# Patient Record
Sex: FEMALE | Race: WHITE | NOT HISPANIC OR LATINO
[De-identification: names, ages, dates, MRNs, and addresses within clinical notes are randomized per-mention and may not be internally consistent; named-entity substitution may affect disease eponyms.]

---

## 2017-03-23 ENCOUNTER — APPOINTMENT (OUTPATIENT)
Dept: MAMMOGRAPHY | Facility: HOSPITAL | Age: 62
End: 2017-03-23

## 2017-03-23 ENCOUNTER — OUTPATIENT (OUTPATIENT)
Dept: OUTPATIENT SERVICES | Facility: HOSPITAL | Age: 62
LOS: 1 days | End: 2017-03-23
Payer: COMMERCIAL

## 2017-03-23 PROCEDURE — 77067 SCR MAMMO BI INCL CAD: CPT

## 2017-03-23 PROCEDURE — 77063 BREAST TOMOSYNTHESIS BI: CPT

## 2017-04-13 ENCOUNTER — APPOINTMENT (OUTPATIENT)
Dept: RHEUMATOLOGY | Facility: CLINIC | Age: 62
End: 2017-04-13

## 2017-04-13 VITALS
HEART RATE: 78 BPM | HEIGHT: 63 IN | BODY MASS INDEX: 29.41 KG/M2 | SYSTOLIC BLOOD PRESSURE: 129 MMHG | WEIGHT: 166 LBS | TEMPERATURE: 98.2 F | DIASTOLIC BLOOD PRESSURE: 82 MMHG

## 2017-04-13 DIAGNOSIS — Z82.49 FAMILY HISTORY OF ISCHEMIC HEART DISEASE AND OTHER DISEASES OF THE CIRCULATORY SYSTEM: ICD-10-CM

## 2017-04-13 DIAGNOSIS — I10 ESSENTIAL (PRIMARY) HYPERTENSION: ICD-10-CM

## 2017-04-13 DIAGNOSIS — M19.90 UNSPECIFIED OSTEOARTHRITIS, UNSPECIFIED SITE: ICD-10-CM

## 2017-04-13 DIAGNOSIS — Z82.61 FAMILY HISTORY OF ARTHRITIS: ICD-10-CM

## 2017-04-13 DIAGNOSIS — Z80.6 FAMILY HISTORY OF LEUKEMIA: ICD-10-CM

## 2017-04-13 DIAGNOSIS — Z86.39 PERSONAL HISTORY OF OTHER ENDOCRINE, NUTRITIONAL AND METABOLIC DISEASE: ICD-10-CM

## 2017-04-13 RX ORDER — LABETALOL HCL 200 MG
200 TABLET ORAL
Refills: 0 | Status: ACTIVE | COMMUNITY

## 2017-04-13 RX ORDER — CHOLECALCIFEROL (VITAMIN D3) 25 MCG
TABLET ORAL
Refills: 0 | Status: ACTIVE | COMMUNITY

## 2017-04-14 ENCOUNTER — TRANSCRIPTION ENCOUNTER (OUTPATIENT)
Age: 62
End: 2017-04-14

## 2017-04-14 LAB
25(OH)D3 SERPL-MCNC: 24.5 NG/ML
ALBUMIN SERPL ELPH-MCNC: 4.6 G/DL
ALP BLD-CCNC: 93 U/L
ALT SERPL-CCNC: 30 U/L
ANION GAP SERPL CALC-SCNC: 18 MMOL/L
AST SERPL-CCNC: 24 U/L
BASOPHILS # BLD AUTO: 0.06 K/UL
BASOPHILS NFR BLD AUTO: 1.3 %
BILIRUB SERPL-MCNC: <0.2 MG/DL
BUN SERPL-MCNC: 22 MG/DL
CALCIUM SERPL-MCNC: 9.7 MG/DL
CCP AB SER IA-ACNC: <8 UNITS
CHLORIDE SERPL-SCNC: 101 MMOL/L
CO2 SERPL-SCNC: 22 MMOL/L
CREAT SERPL-MCNC: 1.01 MG/DL
CRP SERPL-MCNC: <0.2 MG/DL
EOSINOPHIL # BLD AUTO: 0.27 K/UL
EOSINOPHIL NFR BLD AUTO: 5.7 %
ERYTHROCYTE [SEDIMENTATION RATE] IN BLOOD BY WESTERGREN METHOD: 13 MM/HR
GLUCOSE SERPL-MCNC: 103 MG/DL
HCT VFR BLD CALC: 36.7 %
HGB BLD-MCNC: 12 G/DL
IMM GRANULOCYTES NFR BLD AUTO: 0 %
LYMPHOCYTES # BLD AUTO: 1.91 K/UL
LYMPHOCYTES NFR BLD AUTO: 40.1 %
MAN DIFF?: NORMAL
MCHC RBC-ENTMCNC: 30.4 PG
MCHC RBC-ENTMCNC: 32.7 GM/DL
MCV RBC AUTO: 92.9 FL
MONOCYTES # BLD AUTO: 0.31 K/UL
MONOCYTES NFR BLD AUTO: 6.5 %
NEUTROPHILS # BLD AUTO: 2.21 K/UL
NEUTROPHILS NFR BLD AUTO: 46.4 %
PLATELET # BLD AUTO: 275 K/UL
POTASSIUM SERPL-SCNC: 4.2 MMOL/L
PROT SERPL-MCNC: 7 G/DL
RBC # BLD: 3.95 M/UL
RBC # FLD: 12.8 %
RF+CCP IGG SER-IMP: NEGATIVE
RHEUMATOID FACT SER QL: <7 IU/ML
SODIUM SERPL-SCNC: 141 MMOL/L
TSH SERPL-ACNC: 3.21 UIU/ML
WBC # FLD AUTO: 4.76 K/UL

## 2017-05-05 ENCOUNTER — APPOINTMENT (OUTPATIENT)
Dept: RHEUMATOLOGY | Facility: CLINIC | Age: 62
End: 2017-05-05

## 2017-05-05 VITALS
TEMPERATURE: 98.2 F | HEART RATE: 83 BPM | DIASTOLIC BLOOD PRESSURE: 70 MMHG | OXYGEN SATURATION: 98 % | HEIGHT: 63 IN | BODY MASS INDEX: 29.59 KG/M2 | WEIGHT: 167 LBS | SYSTOLIC BLOOD PRESSURE: 105 MMHG

## 2017-05-05 DIAGNOSIS — M25.551 PAIN IN RIGHT HIP: ICD-10-CM

## 2017-05-05 DIAGNOSIS — M19.049 PRIMARY OSTEOARTHRITIS, UNSPECIFIED HAND: ICD-10-CM

## 2017-05-05 DIAGNOSIS — M25.552 PAIN IN LEFT HIP: ICD-10-CM

## 2017-05-07 PROBLEM — M19.049 HAND ARTHRITIS: Status: ACTIVE | Noted: 2017-04-13

## 2017-05-08 PROBLEM — M25.552 LEFT HIP PAIN: Status: ACTIVE | Noted: 2017-05-08

## 2017-05-15 ENCOUNTER — TRANSCRIPTION ENCOUNTER (OUTPATIENT)
Age: 62
End: 2017-05-15

## 2017-06-16 ENCOUNTER — APPOINTMENT (OUTPATIENT)
Dept: RHEUMATOLOGY | Facility: CLINIC | Age: 62
End: 2017-06-16

## 2018-03-27 ENCOUNTER — OUTPATIENT (OUTPATIENT)
Dept: OUTPATIENT SERVICES | Facility: HOSPITAL | Age: 63
LOS: 1 days | End: 2018-03-27
Payer: COMMERCIAL

## 2018-03-27 ENCOUNTER — APPOINTMENT (OUTPATIENT)
Dept: MAMMOGRAPHY | Facility: HOSPITAL | Age: 63
End: 2018-03-27
Payer: COMMERCIAL

## 2018-03-27 DIAGNOSIS — Z00.8 ENCOUNTER FOR OTHER GENERAL EXAMINATION: ICD-10-CM

## 2018-03-27 PROCEDURE — 77067 SCR MAMMO BI INCL CAD: CPT

## 2018-03-27 PROCEDURE — 77067 SCR MAMMO BI INCL CAD: CPT | Mod: 26

## 2018-03-27 PROCEDURE — 77063 BREAST TOMOSYNTHESIS BI: CPT

## 2018-03-27 PROCEDURE — 77063 BREAST TOMOSYNTHESIS BI: CPT | Mod: 26

## 2019-04-02 ENCOUNTER — APPOINTMENT (OUTPATIENT)
Dept: MAMMOGRAPHY | Facility: HOSPITAL | Age: 64
End: 2019-04-02
Payer: COMMERCIAL

## 2019-04-02 ENCOUNTER — OUTPATIENT (OUTPATIENT)
Dept: OUTPATIENT SERVICES | Facility: HOSPITAL | Age: 64
LOS: 1 days | End: 2019-04-02
Payer: COMMERCIAL

## 2019-04-02 DIAGNOSIS — Z00.8 ENCOUNTER FOR OTHER GENERAL EXAMINATION: ICD-10-CM

## 2019-04-02 PROCEDURE — 77067 SCR MAMMO BI INCL CAD: CPT | Mod: 26

## 2019-04-02 PROCEDURE — 77067 SCR MAMMO BI INCL CAD: CPT

## 2019-04-02 PROCEDURE — 77063 BREAST TOMOSYNTHESIS BI: CPT | Mod: 26

## 2019-04-02 PROCEDURE — 77063 BREAST TOMOSYNTHESIS BI: CPT

## 2020-04-29 ENCOUNTER — TRANSCRIPTION ENCOUNTER (OUTPATIENT)
Age: 65
End: 2020-04-29

## 2020-12-23 ENCOUNTER — TRANSCRIPTION ENCOUNTER (OUTPATIENT)
Age: 65
End: 2020-12-23

## 2021-01-01 ENCOUNTER — TRANSCRIPTION ENCOUNTER (OUTPATIENT)
Age: 66
End: 2021-01-01

## 2021-01-28 ENCOUNTER — TRANSCRIPTION ENCOUNTER (OUTPATIENT)
Age: 66
End: 2021-01-28

## 2021-03-11 ENCOUNTER — OUTPATIENT (OUTPATIENT)
Dept: OUTPATIENT SERVICES | Facility: HOSPITAL | Age: 66
LOS: 1 days | End: 2021-03-11
Payer: COMMERCIAL

## 2021-03-11 ENCOUNTER — APPOINTMENT (OUTPATIENT)
Dept: MAMMOGRAPHY | Facility: HOSPITAL | Age: 66
End: 2021-03-11
Payer: COMMERCIAL

## 2021-03-11 DIAGNOSIS — Z00.8 ENCOUNTER FOR OTHER GENERAL EXAMINATION: ICD-10-CM

## 2021-03-11 PROCEDURE — 77067 SCR MAMMO BI INCL CAD: CPT | Mod: 26

## 2021-03-11 PROCEDURE — 77067 SCR MAMMO BI INCL CAD: CPT

## 2021-03-11 PROCEDURE — 77063 BREAST TOMOSYNTHESIS BI: CPT | Mod: 26

## 2021-03-11 PROCEDURE — 77063 BREAST TOMOSYNTHESIS BI: CPT

## 2021-05-04 ENCOUNTER — OUTPATIENT (OUTPATIENT)
Dept: OUTPATIENT SERVICES | Facility: HOSPITAL | Age: 66
LOS: 1 days | End: 2021-05-04
Payer: COMMERCIAL

## 2021-05-04 ENCOUNTER — APPOINTMENT (OUTPATIENT)
Dept: CT IMAGING | Facility: HOSPITAL | Age: 66
End: 2021-05-04
Payer: COMMERCIAL

## 2021-05-04 ENCOUNTER — APPOINTMENT (OUTPATIENT)
Dept: ULTRASOUND IMAGING | Facility: HOSPITAL | Age: 66
End: 2021-05-04
Payer: COMMERCIAL

## 2021-05-04 DIAGNOSIS — Z00.8 ENCOUNTER FOR OTHER GENERAL EXAMINATION: ICD-10-CM

## 2021-05-04 PROCEDURE — 76641 ULTRASOUND BREAST COMPLETE: CPT | Mod: 26,50

## 2021-05-04 PROCEDURE — 76641 ULTRASOUND BREAST COMPLETE: CPT

## 2021-05-04 PROCEDURE — 71250 CT THORAX DX C-: CPT

## 2021-05-04 PROCEDURE — 71250 CT THORAX DX C-: CPT | Mod: 26

## 2021-07-30 ENCOUNTER — TRANSCRIPTION ENCOUNTER (OUTPATIENT)
Age: 66
End: 2021-07-30

## 2021-10-01 ENCOUNTER — TRANSCRIPTION ENCOUNTER (OUTPATIENT)
Age: 66
End: 2021-10-01

## 2022-01-04 ENCOUNTER — EMERGENCY (EMERGENCY)
Facility: HOSPITAL | Age: 67
LOS: 1 days | Discharge: HOME | End: 2022-01-04
Attending: INTERNAL MEDICINE | Admitting: INTERNAL MEDICINE
Payer: COMMERCIAL

## 2022-01-04 VITALS
SYSTOLIC BLOOD PRESSURE: 148 MMHG | HEIGHT: 65 IN | RESPIRATION RATE: 16 BRPM | DIASTOLIC BLOOD PRESSURE: 92 MMHG | TEMPERATURE: 97 F | OXYGEN SATURATION: 96 % | HEART RATE: 91 BPM | WEIGHT: 160.06 LBS

## 2022-01-04 VITALS
HEART RATE: 86 BPM | RESPIRATION RATE: 16 BRPM | SYSTOLIC BLOOD PRESSURE: 136 MMHG | DIASTOLIC BLOOD PRESSURE: 86 MMHG | OXYGEN SATURATION: 97 %

## 2022-01-04 PROCEDURE — 93010 ELECTROCARDIOGRAM REPORT: CPT

## 2022-01-04 PROCEDURE — 71045 X-RAY EXAM CHEST 1 VIEW: CPT | Mod: 26

## 2022-01-04 PROCEDURE — 99285 EMERGENCY DEPT VISIT HI MDM: CPT

## 2022-01-04 PROCEDURE — 99283 EMERGENCY DEPT VISIT LOW MDM: CPT | Mod: 25

## 2022-01-04 PROCEDURE — 71045 X-RAY EXAM CHEST 1 VIEW: CPT

## 2022-01-04 PROCEDURE — 93005 ELECTROCARDIOGRAM TRACING: CPT

## 2022-01-04 RX ORDER — DIAZEPAM 5 MG
1 TABLET ORAL
Qty: 7 | Refills: 0
Start: 2022-01-04 | End: 2022-01-10

## 2022-01-04 RX ORDER — DIAZEPAM 5 MG
5 TABLET ORAL ONCE
Refills: 0 | Status: DISCONTINUED | OUTPATIENT
Start: 2022-01-04 | End: 2022-01-04

## 2022-01-04 RX ORDER — LIDOCAINE 4 G/100G
1 CREAM TOPICAL ONCE
Refills: 0 | Status: COMPLETED | OUTPATIENT
Start: 2022-01-04 | End: 2022-01-04

## 2022-01-04 RX ORDER — LIDOCAINE 4 G/100G
1 CREAM TOPICAL
Qty: 20 | Refills: 0
Start: 2022-01-04 | End: 2022-01-13

## 2022-01-04 RX ORDER — IBUPROFEN 200 MG
600 TABLET ORAL ONCE
Refills: 0 | Status: COMPLETED | OUTPATIENT
Start: 2022-01-04 | End: 2022-01-04

## 2022-01-04 RX ORDER — IBUPROFEN 200 MG
1 TABLET ORAL
Qty: 40 | Refills: 0
Start: 2022-01-04 | End: 2022-01-13

## 2022-01-04 RX ADMIN — LIDOCAINE 1 PATCH: 4 CREAM TOPICAL at 11:01

## 2022-01-04 RX ADMIN — Medication 600 MILLIGRAM(S): at 11:00

## 2022-01-04 RX ADMIN — Medication 5 MILLIGRAM(S): at 11:00

## 2022-01-04 NOTE — ED PROVIDER NOTE - CLINICAL SUMMARY MEDICAL DECISION MAKING FREE TEXT BOX
r upper back pain few days seen by pmd rx prednisone, flexeril with no relief  onset gradual   locations R upper back   duration few days   characteristics upper back pain   context seen by pmd dc with flexeril and prednisone with no relief  aggravating factors worse om movement ,   relieving factors none   timming constant   severity moderate  cxr  marcela , ekg nsr   rx with valium lidocaine patch and nsaids with relief

## 2022-01-04 NOTE — ED ADULT NURSE NOTE - NSIMPLEMENTINTERV_GEN_ALL_ED
Implemented All Universal Safety Interventions:  [place] to call system. Call bell, personal items and telephone within reach. Instruct patient to call for assistance. Room bathroom lighting operational. Non-slip footwear when patient is off stretcher. Physically safe environment: no spills, clutter or unnecessary equipment. Stretcher in lowest position, wheels locked, appropriate side rails in place.

## 2022-01-04 NOTE — ED PROVIDER NOTE - CARE PROVIDER_API CALL
Jose Dumont)  Orthopaedic Sports Medicine; Orthopaedic Surgery  [address] [address]  Phone: [phone]  Fax: [phone]  Follow Up Time:

## 2022-01-04 NOTE — ED PROVIDER NOTE - PHYSICAL EXAMINATION
General:     NAD, well-nourished, well-appearing  Head:     NC/AT, EOMI, oral mucosa moist  Neck:     trachea midline  Lungs:     CTA b/l, no w/r/r  R interscapular tenderness  CVS:     S1S2, RRR, no m/g/r  Abd:     +BS, s/nt/nd, no organomegaly  Ext:    2+ radial and pedal pulses, no c/c/e  Neuro: AAOx3, no sensory/motor deficits

## 2022-01-04 NOTE — ED PROVIDER NOTE - OBJECTIVE STATEMENT
r upper back pain few days seen by pmd rx prednisone, flexeril with no relief r upper back pain few days seen by pmd rx prednisone, flexeril with no relief  onset gradual   locations R upper back   duration few days   characteristics upper back pain   context seen by pmd dc with flexeril and prednisone with no relief  aggravating factors worse om movement ,   relieving factors none   timming constant   severity moderate

## 2022-01-04 NOTE — ED ADULT NURSE NOTE - OBJECTIVE STATEMENT
patient presents to ED complaining of R. shoulder/back pain, given prednisone pack and flexiril by PMD without relief. patient AOx4, breathing symmetrical and unlabored, in no acute distress at this time, will continue to monitor.

## 2022-01-04 NOTE — ED PROVIDER NOTE - NSCAREINITIATED _GEN_ER
Biliblanket d/c'd per order. MOB educated that rebound bili will be drawn in the morning. Stef Brock(Attending)

## 2022-01-04 NOTE — ED PROVIDER NOTE - PATIENT PORTAL LINK FT
You can access the FollowMyHealth Patient Portal offered by [facility] by registering at the following website: [email or web address]. By joining [employer]’s FollowMyHealth portal, you will also be able to view your health information using other applications (apps) compatible with our system.

## 2022-01-05 ENCOUNTER — NON-APPOINTMENT (OUTPATIENT)
Age: 67
End: 2022-01-05

## 2022-01-06 ENCOUNTER — NON-APPOINTMENT (OUTPATIENT)
Age: 67
End: 2022-01-06

## 2022-01-06 ENCOUNTER — APPOINTMENT (OUTPATIENT)
Dept: ORTHOPEDIC SURGERY | Facility: CLINIC | Age: 67
End: 2022-01-06
Payer: COMMERCIAL

## 2022-01-06 VITALS
DIASTOLIC BLOOD PRESSURE: 87 MMHG | BODY MASS INDEX: 26.66 KG/M2 | WEIGHT: 160 LBS | SYSTOLIC BLOOD PRESSURE: 135 MMHG | HEIGHT: 65 IN | HEART RATE: 105 BPM

## 2022-01-06 DIAGNOSIS — M47.22 OTHER SPONDYLOSIS WITH RADICULOPATHY, CERVICAL REGION: ICD-10-CM

## 2022-01-06 DIAGNOSIS — M54.12 RADICULOPATHY, CERVICAL REGION: ICD-10-CM

## 2022-01-06 DIAGNOSIS — M75.41 IMPINGEMENT SYNDROME OF RIGHT SHOULDER: ICD-10-CM

## 2022-01-06 PROCEDURE — 73030 X-RAY EXAM OF SHOULDER: CPT | Mod: RT

## 2022-01-06 PROCEDURE — 72050 X-RAY EXAM NECK SPINE 4/5VWS: CPT

## 2022-01-06 PROCEDURE — 99203 OFFICE O/P NEW LOW 30 MIN: CPT

## 2022-01-06 NOTE — CONSULT LETTER
[Dear  ___] : Dear  [unfilled], [Consult Letter:] : I had the pleasure of evaluating your patient, [unfilled]. [Please see my note below.] : Please see my note below. [Consult Closing:] : Thank you very much for allowing me to participate in the care of this patient.  If you have any questions, please do not hesitate to contact me. [Sincerely,] : Sincerely, [FreeTextEntry3] : Dr. Jose Dumont \par \par

## 2022-01-06 NOTE — HISTORY OF PRESENT ILLNESS
[de-identified] : VIKTOR CHUN is a 66 year old RHD female presenting to the office complaining of right shoulder pain. Patient reports pain since December 2021. Patient denies injury or trauma to the area. She notes she walks her husky puppy daily but does not recall any injury from this. The patient describes the pain as a dull aching, and occasionally sharp pain localized to the anterior posterior of her right shoulder that is intermittent in nature. Her  symptoms are exacerbated with any movement of the shoulder. Patient reports the pain is waking her up at night.  Patient reports associated weakness. Reports numbness and tingling in the right upper extremity into the hand.  She saw her PCP on 12/28/2021 who prescribed Flexeril and a Medrol dose efraín noting no relief in symptoms. She went to the ED on 1/4/2022 due to severe pain where she was prescribed lidocaine patches, naproxen and Valium noting no relief in symptoms.

## 2022-01-06 NOTE — PHYSICAL EXAM
[de-identified] : Cervical Spine/Neck\par Inspection/Palpation :\par ¦ Inspection : alignment midline, normal degree of lordosis present\par ¦ Skin : normal appearance, no masses, no tenderness, trachea midline\par ¦ Palpation : marked right paraspinal, trapezius periscapular musculature is tender to palpation\par ¦ Tests and Signs : Spurling’s (-), Lhermitte’s (-) Zeina’s Reflex (-) \par ¦ Range of Motion : arc of motion full in all planes, no crepitus or pain with ROM\par ¦ Stability : no subluxations or other evidence of instability demonstrated during range of motion testing\par o Muscle Strength : paraspinal muscle strength within normal limits\par o Muscle Tone : paraspinal muscle tone within normal limits\par o Muscle Bulk : normal, no atrophy\par o Cervical Lymph Nodes : no lymphadenopathy present\par ¦ Upper Extremity Strength : elbow flexion 5/5, elbow extension 5/5, wrist extension 5/5, wrist flexion 5/5, ulnar deviation  5/5,  radial deviation 5/5, all intrinsic and extrinsic hand muscles 5/5,  strength 5/5\par o Special Tests: Froment (- bilateral) \par \par  Right Upper Extremity\par o Shoulder :\par ¦ Inspection/Palpation :  no tenderness over the greater tuberosity, no acromioclavicular joint tenderness, no tenderness anterior and posterior glenohumeral joint,no swelling, no deformities\par ¦ Range of Motion : ACTIVE FORWARD ELEVATION: Measured at 120 degrees, ACTIVE EXTERNAL ROTATION: Measured at 75 degrees, ACTIVE INTERNAL ROTATION: Measured at GT\par ¦ Strength : external rotation 5/5, internal rotation 5/5, supraspinatus 5/5\par ¦ Stability : no joint instability on provocative testing\par ¦ Tests/Signs : Neer (+), Garcia (+)\par o Upper Arm : no tenderness, no swelling, no deformities\par o Muscle Bulk : no atrophy\par o Sensation : sensation intact to light touch\par o Skin : no skin rash or discoloration\par o Vascular Exam : no edema, no cyanosis, radial and ulnar pulses normal \par \par Left Upper Extremity\par o Shoulder :\par ¦ Inspection/Palpation :  no tenderness over the greater tuberosity, no acromioclavicular joint tenderness, no tenderness anterior and posterior glenohumeral joint,no swelling, no deformities\par ¦ Range of Motion : ACTIVE FORWARD ELEVATION: Measured at 130 degrees, ACTIVE EXTERNAL ROTATION: Measured at 75 degrees, ACTIVE INTERNAL ROTATION: Measured at T12\par ¦ Strength : external rotation 5/5, internal rotation 5/5, supraspinatus 5/5\par ¦ Stability : no joint instability on provocative testing\par ¦ Tests/Signs : Neer (-), Garcia (-)\par o Upper Arm : no tenderness, no swelling, no deformities\par o Muscle Bulk : no atrophy\par o Sensation : sensation intact to light touch\par o Skin : no skin rash or discoloration\par o Vascular Exam : no edema, no cyanosis, radial and ulnar pulses normal  [de-identified] : o Cervical Spine : AP, lateral, and oblique views were obtained, there are no soft tissue abnormalities, no fractures, straightening of the normal cervical lordosis,  normal bone density, no bony lesions, marked diffuse degenerative changes, advanced C5/C6, C6/C7, C7/T1, C4/C5, C5/C6 foraminal narrowing spondylolisthesis C4/C5. \par \par \par o RIGHT Shoulder : Grashey, Axillary and Outlet views were obtained, there are no soft tissue abnormalities, no fractures, alignment is normal, normal appearing joint spaces, normal bone density, no bony lesions.\par \par

## 2022-01-06 NOTE — DISCUSSION/SUMMARY
[de-identified] : The underlying pathophysiology was reviewed in great detail with the patient as well as the various treatment options, including ice, analgesics, NSAIDs, Physical therapy, steroid injections, referral to spinal specialist. \par \par A prescription was provided for a MRI of the cervical spine to rule out HNP\par \par Activity modifications and restrictions were discussed. I advised avoiding overhead lifting. I advised the patient to work on good posture. \par \par A home exercise sheet was given and discussed with the patient to follow. \par \par FU 6 weeks\par \par All questions were answered, all alternatives discussed and the patient is in complete agreement with that plan. Follow-up appointment as instructed. Any issues and the patient will call or come in sooner.

## 2022-01-10 ENCOUNTER — APPOINTMENT (OUTPATIENT)
Dept: MRI IMAGING | Facility: HOSPITAL | Age: 67
End: 2022-01-10
Payer: COMMERCIAL

## 2022-01-10 ENCOUNTER — OUTPATIENT (OUTPATIENT)
Dept: OUTPATIENT SERVICES | Facility: HOSPITAL | Age: 67
LOS: 1 days | End: 2022-01-10
Payer: COMMERCIAL

## 2022-01-10 DIAGNOSIS — M47.22 OTHER SPONDYLOSIS WITH RADICULOPATHY, CERVICAL REGION: ICD-10-CM

## 2022-01-10 PROCEDURE — 72141 MRI NECK SPINE W/O DYE: CPT

## 2022-01-10 PROCEDURE — 72141 MRI NECK SPINE W/O DYE: CPT | Mod: 26

## 2022-01-13 ENCOUNTER — NON-APPOINTMENT (OUTPATIENT)
Age: 67
End: 2022-01-13

## 2022-04-07 ENCOUNTER — APPOINTMENT (OUTPATIENT)
Dept: ORTHOPEDIC SURGERY | Facility: CLINIC | Age: 67
End: 2022-04-07
Payer: COMMERCIAL

## 2022-04-07 VITALS — HEIGHT: 65 IN | BODY MASS INDEX: 26.66 KG/M2 | WEIGHT: 160 LBS

## 2022-04-07 PROCEDURE — 72100 X-RAY EXAM L-S SPINE 2/3 VWS: CPT

## 2022-04-07 PROCEDURE — 99214 OFFICE O/P EST MOD 30 MIN: CPT

## 2022-04-07 NOTE — DISCUSSION/SUMMARY
[de-identified] : The underlying pathophysiology was reviewed in great detail with the patient as well as the various treatment options, including ice, analgesics, NSAIDS, Physical therapy, steroid injections.\par \par A prescription was provided for physical therapy\par \par We discussed f/u with a spine specialist.

## 2022-04-07 NOTE — HISTORY OF PRESENT ILLNESS
[Pain Location] : pain [] : right knee [Lumbar] : lumbar region [Worsening] : worsening [Walking] : walking [de-identified] : Established patient coming in with a new complaint of low back pain radiating down into the right leg. \par Patient continues to do physical therapy of the cervical spine and progressing well

## 2022-04-07 NOTE — PHYSICAL EXAM
[Normal] : no peripheral adenopathy appreciated [de-identified] : No palpable tenderness of the lumbar spine, right sided thoracolumbar  prominence on forward bend\par Patient is unable to stretch the hamstrings\par Negative tension sign\par 5/5 strength with flexion of the hips [de-identified] : \par o Lumbosacral Spine : AP and lateral views were obtained, there are no soft tissue abnormalities, 37 degree degenerative scoliosis from L1-L5, no fractures, marked diffuse degenerative disc disease, normal bone density, no bony lesions\par \par

## 2022-05-05 ENCOUNTER — APPOINTMENT (OUTPATIENT)
Dept: ORTHOPEDIC SURGERY | Facility: CLINIC | Age: 67
End: 2022-05-05
Payer: COMMERCIAL

## 2022-05-05 VITALS — BODY MASS INDEX: 26.66 KG/M2 | WEIGHT: 160 LBS | HEIGHT: 65 IN

## 2022-05-05 DIAGNOSIS — M41.26 OTHER IDIOPATHIC SCOLIOSIS, LUMBAR REGION: ICD-10-CM

## 2022-05-05 PROCEDURE — 99214 OFFICE O/P EST MOD 30 MIN: CPT

## 2022-05-07 ENCOUNTER — OUTPATIENT (OUTPATIENT)
Dept: OUTPATIENT SERVICES | Facility: HOSPITAL | Age: 67
LOS: 1 days | End: 2022-05-07
Payer: COMMERCIAL

## 2022-05-07 ENCOUNTER — APPOINTMENT (OUTPATIENT)
Dept: MRI IMAGING | Facility: HOSPITAL | Age: 67
End: 2022-05-07
Payer: COMMERCIAL

## 2022-05-07 DIAGNOSIS — M54.16 RADICULOPATHY, LUMBAR REGION: ICD-10-CM

## 2022-05-07 PROBLEM — M41.26 OTHER IDIOPATHIC SCOLIOSIS, LUMBAR REGION: Status: ACTIVE | Noted: 2022-04-07

## 2022-05-07 PROCEDURE — 72148 MRI LUMBAR SPINE W/O DYE: CPT

## 2022-05-07 PROCEDURE — 72148 MRI LUMBAR SPINE W/O DYE: CPT | Mod: 26

## 2022-05-07 NOTE — DISCUSSION/SUMMARY
[de-identified] : The underlying pathophysiology was reviewed in great detail with the patient as well as the various treatment options, including ice, analgesics, NSAIDS, Physical therapy, steroid injections.\par \par A prescription was provided for MRI to r/o HNP\par \par A prescription was given for a medrol dose pack\par \par We discussed f/u with a spine specialist.\par \par F/U after MRI

## 2022-05-07 NOTE — PHYSICAL EXAM
[Normal] : No costovertebral angle tenderness and no spinal tenderness [de-identified] : Back: No costovertebral angle tenderness and no spinal tenderness, right sided thoracolumbar prominence on forward bend with tenderness in the right paraspinal musculature\par Patient is unable to stretch the hamstrings\par + straight leg raise sign on the right\par 5/5 strength with flexion of the hips. \par Reflexes intact\par Sensation intact [de-identified] : No palpable tenderness of the lumbar spine, right sided thoracolumbar  prominence on forward bend\par Patient is unable to stretch the hamstrings\par Negative tension sign\par 5/5 strength with flexion of the hips [de-identified] : x-rays reviewed from prior visit:\par o Lumbosacral Spine : AP and lateral views were obtained, there are no soft tissue abnormalities, 37 degree degenerative scoliosis from L1-L5, no fractures, marked diffuse degenerative disc disease, normal bone density, no bony lesions\par \par

## 2022-05-07 NOTE — HISTORY OF PRESENT ILLNESS
[Pain Location] : pain [] : right knee [Lumbar] : lumbar region [Worsening] : worsening [Walking] : walking [de-identified] : Established patient coming in with a new complaint of low back pain radiating down into the right leg. \par Patient continues to do physical therapy from 4/7/22.

## 2022-05-13 ENCOUNTER — NON-APPOINTMENT (OUTPATIENT)
Age: 67
End: 2022-05-13

## 2022-05-16 ENCOUNTER — NON-APPOINTMENT (OUTPATIENT)
Age: 67
End: 2022-05-16

## 2022-05-18 ENCOUNTER — APPOINTMENT (OUTPATIENT)
Dept: ORTHOPEDIC SURGERY | Facility: CLINIC | Age: 67
End: 2022-05-18
Payer: COMMERCIAL

## 2022-05-18 VITALS — WEIGHT: 160 LBS | BODY MASS INDEX: 26.66 KG/M2 | HEIGHT: 65 IN

## 2022-05-18 DIAGNOSIS — S93.602A UNSPECIFIED SPRAIN OF LEFT FOOT, INITIAL ENCOUNTER: ICD-10-CM

## 2022-05-18 DIAGNOSIS — S00.93XA CONTUSION OF UNSPECIFIED PART OF HEAD, INITIAL ENCOUNTER: ICD-10-CM

## 2022-05-18 PROCEDURE — 73630 X-RAY EXAM OF FOOT: CPT | Mod: LT

## 2022-05-18 PROCEDURE — 99203 OFFICE O/P NEW LOW 30 MIN: CPT

## 2022-05-18 RX ORDER — ATORVASTATIN CALCIUM 10 MG/1
10 TABLET, FILM COATED ORAL
Refills: 0 | Status: COMPLETED | COMMUNITY
End: 2022-05-18

## 2022-05-18 RX ORDER — DICLOFENAC SODIUM 50 MG/1
50 TABLET, DELAYED RELEASE ORAL
Refills: 0 | Status: COMPLETED | COMMUNITY
End: 2022-05-18

## 2022-05-18 RX ORDER — METHYLPREDNISOLONE 4 MG/1
4 TABLET ORAL
Qty: 1 | Refills: 0 | Status: COMPLETED | COMMUNITY
Start: 2022-05-05 | End: 2022-05-18

## 2022-05-18 RX ORDER — PREDNISONE 2.5 MG/1
2.5 TABLET ORAL
Qty: 18 | Refills: 0 | Status: DISCONTINUED | COMMUNITY
Start: 2017-04-13 | End: 2022-05-18

## 2022-05-18 NOTE — DISCUSSION/SUMMARY
[de-identified] : The patient was placed in a short leg Cam Walker\par She will be weightbearing as tolerated with cane or crutches\par She may remove Cam Walker for bathing and sleeping if comfortable\par She will have CT scan of her head to rule out occult injury\par She will have MRI left foot\par Ice and Tylenol p.r.n.\par \par Impression:\par Status post fall 5/17/22\par Contusion head\par Sprain left foot

## 2022-05-18 NOTE — PHYSICAL EXAM
[Normal Mood and Affect] : normal mood and affect [Able to Communicate] : able to communicate [Well Developed] : well developed [Well Nourished] : well nourished [2+] : dorsalis pedis pulse: 2+ [] : ambulation with crutches [Left] : left foot [FreeTextEntry3] : Mild swelling dorsal aspect of the midfoot [FreeTextEntry8] : Mild to moderate tenderness over the first and second tarsometatarsal joints.  No tenderness over her toes [de-identified] : Reviewed and interpreted.  Left foot AP, lateral oblique-negative

## 2022-05-18 NOTE — HISTORY OF PRESENT ILLNESS
[Sudden] : sudden [8] : 8 [Dull/Aching] : dull/aching [Sharp] : sharp [Household chores] : household chores [Rest] : rest [Walking] : walking [Full time] : Work status: full time [de-identified] : Urgent visit.  Date of injury 5/17/22.  The patient was pulled by her dog and fell injuring her left foot.  Seen at [facility] urgent care and had x-rays.  Told of possible fracture left foot.  She was placed in a short leg splint.  Given crutches.  She says she hit her head against the wall when she fell.  She denies any headaches or swelling in her head.  Her neck is fine.\par She has mild to moderate pain left midfoot.  Mild swelling.  Seen today with her , Gary [] : Post Surgical Visit: no [FreeTextEntry1] : L Foot  [de-identified] : [employer]

## 2022-05-19 ENCOUNTER — APPOINTMENT (OUTPATIENT)
Dept: CT IMAGING | Facility: HOSPITAL | Age: 67
End: 2022-05-19
Payer: COMMERCIAL

## 2022-05-19 ENCOUNTER — RESULT REVIEW (OUTPATIENT)
Age: 67
End: 2022-05-19

## 2022-05-19 ENCOUNTER — OUTPATIENT (OUTPATIENT)
Dept: OUTPATIENT SERVICES | Facility: HOSPITAL | Age: 67
LOS: 1 days | End: 2022-05-19
Payer: COMMERCIAL

## 2022-05-19 DIAGNOSIS — S00.93XA CONTUSION OF UNSPECIFIED PART OF HEAD, INITIAL ENCOUNTER: ICD-10-CM

## 2022-05-19 PROCEDURE — 70450 CT HEAD/BRAIN W/O DYE: CPT

## 2022-05-19 PROCEDURE — 70450 CT HEAD/BRAIN W/O DYE: CPT | Mod: 26

## 2022-05-20 ENCOUNTER — NON-APPOINTMENT (OUTPATIENT)
Age: 67
End: 2022-05-20

## 2022-05-20 ENCOUNTER — RESULT REVIEW (OUTPATIENT)
Age: 67
End: 2022-05-20

## 2022-05-20 ENCOUNTER — APPOINTMENT (OUTPATIENT)
Dept: MRI IMAGING | Facility: HOSPITAL | Age: 67
End: 2022-05-20
Payer: COMMERCIAL

## 2022-05-20 ENCOUNTER — OUTPATIENT (OUTPATIENT)
Dept: OUTPATIENT SERVICES | Facility: HOSPITAL | Age: 67
LOS: 1 days | End: 2022-05-20
Payer: COMMERCIAL
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
## 2022-05-20 DIAGNOSIS — M67.472 GANGLION, LEFT ANKLE AND FOOT: ICD-10-CM

## 2022-05-20 DIAGNOSIS — M79.672 PAIN IN LEFT FOOT: ICD-10-CM

## 2022-05-20 DIAGNOSIS — M79.89 OTHER SPECIFIED SOFT TISSUE DISORDERS: ICD-10-CM

## 2022-05-20 DIAGNOSIS — S93.602A UNSPECIFIED SPRAIN OF LEFT FOOT, INITIAL ENCOUNTER: ICD-10-CM
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
## 2022-05-20 PROCEDURE — 73718 MRI LOWER EXTREMITY W/O DYE: CPT | Mod: 26,LT

## 2022-05-20 PROCEDURE — 73718 MRI LOWER EXTREMITY W/O DYE: CPT

## 2022-05-23 ENCOUNTER — NON-APPOINTMENT (OUTPATIENT)
Age: 67
End: 2022-05-23

## 2022-05-24 ENCOUNTER — NON-APPOINTMENT (OUTPATIENT)
Age: 67
End: 2022-05-24

## 2022-05-26 ENCOUNTER — APPOINTMENT (OUTPATIENT)
Dept: NEUROLOGY | Facility: CLINIC | Age: 67
End: 2022-05-26

## 2022-05-27 ENCOUNTER — NON-APPOINTMENT (OUTPATIENT)
Age: 67
End: 2022-05-27

## 2022-06-02 ENCOUNTER — APPOINTMENT (OUTPATIENT)
Dept: ORTHOPEDIC SURGERY | Facility: CLINIC | Age: 67
End: 2022-06-02
Payer: COMMERCIAL

## 2022-06-02 ENCOUNTER — APPOINTMENT (OUTPATIENT)
Dept: ORTHOPEDIC SURGERY | Facility: CLINIC | Age: 67
End: 2022-06-02

## 2022-06-02 ENCOUNTER — NON-APPOINTMENT (OUTPATIENT)
Age: 67
End: 2022-06-02

## 2022-06-02 VITALS — WEIGHT: 160 LBS | BODY MASS INDEX: 26.66 KG/M2 | HEIGHT: 65 IN

## 2022-06-02 DIAGNOSIS — S92.322A DISPLACED FRACTURE OF SECOND METATARSAL BONE, LEFT FOOT, INITIAL ENCOUNTER FOR CLOSED FRACTURE: ICD-10-CM

## 2022-06-02 PROCEDURE — 73630 X-RAY EXAM OF FOOT: CPT | Mod: LT

## 2022-06-02 PROCEDURE — 28470 CLTX METATARSAL FX WO MNP EA: CPT

## 2022-06-02 PROCEDURE — 99214 OFFICE O/P EST MOD 30 MIN: CPT | Mod: 25

## 2022-06-02 NOTE — ASSESSMENT
[FreeTextEntry1] : protected wb in cam boot\par ice/elevate\par nsaids prn\par f/up 2 wks w/ foot xray

## 2022-06-02 NOTE — PHYSICAL EXAM
[Left] : left foot and ankle [NL (20)] : dorsiflexion 20 degrees [NL (40)] : plantar flexion 40 degrees [5___] : eversion 5[unfilled]/5 [2+] : dorsalis pedis pulse: 2+ [] : negative anterior drawer at ankle

## 2022-06-02 NOTE — HISTORY OF PRESENT ILLNESS
[Sudden] : sudden [8] : 8 [0] : 0 [Sharp] : sharp [Constant] : constant [Household chores] : household chores [Leisure] : leisure [Work] : work [Social interactions] : social interactions [Rest] : rest [Standing] : standing [Walking] : walking [Stairs] : stairs [Full time] : Work status: full time [de-identified] : 06/02/2022: pt states she was walking her dog and her dog started running and she fell and twisted her foot on 5/17/22. went to Urgent care and saw dr asher and was put in a boot and sent for MRI. has been wbat in boot. no prior foot probs. no dm/tob.  [] : no [FreeTextEntry1] : LT foot [FreeTextEntry3] : 5/17/22 [de-identified] : boot [de-identified] : Ernesto  [de-identified] : MRI/X-ray

## 2022-06-02 NOTE — DATA REVIEWED
[MRI] : MRI [Foot] : foot [I reviewed the films/CD and additionally noted] : I reviewed the films/CD and additionally noted [FreeTextEntry1] : plantar base 2nd mt [facility]

## 2022-06-08 ENCOUNTER — APPOINTMENT (OUTPATIENT)
Dept: PAIN MANAGEMENT | Facility: CLINIC | Age: 67
End: 2022-06-08
Payer: COMMERCIAL

## 2022-06-08 VITALS — BODY MASS INDEX: 26.66 KG/M2 | WEIGHT: 160 LBS | HEIGHT: 65 IN

## 2022-06-08 PROCEDURE — 99204 OFFICE O/P NEW MOD 45 MIN: CPT

## 2022-06-08 NOTE — HISTORY OF PRESENT ILLNESS
[Lower back] : lower back [10] : 10 [Sharp] : sharp [Shooting] : shooting [Stabbing] : stabbing [Throbbing] : throbbing [Constant] : constant [Household chores] : household chores [Leisure] : leisure [Nothing helps with pain getting better] : Nothing helps with pain getting better [Standing] : standing [FreeTextEntry1] : 06/08/2022 : Patient presents for initial evaluation. She complains of pain in the lower back with radiation down the right lateral leg to the foot. Pain got worse over the last 3 months. PT was not helping. +n/t , No weakness. She takes Advil PRN with limited relief. She saw Dr. Ojeda. she was diagnosed with a foot fracture and currently in a walking boot. \par \par Subjective Weakness:No\par Numbness/Tingling: Yes\par Bladder/Bowel dysfunction: Yes/No\par Physical Therapy: Yes\par \par \par Attempted modalities for current pain complaint:\par See above:\par Medications:No\par \par Injections:No \par \par Previous Spine Surgery: N/A\par \par Imaging:\par MRI Lumbar Spine (5/7/22): \par IMPRESSION:\par Multilevel lumbar spondylosis with dextroscoliosis, exaggerated lumbar lordosis, right lateral translation at L1-2 and left lateral translation at L4-5, and grade 1 anterolisthesis at L5-S1 with bilateral spondylolysis.\par \par Multilevel moderate to severe narrowing of the lateral recesses and neural foramina. Severe central stenosis at L5-S1, moderate central stenosis at L3-4 and L4-5, and milder canal narrowing in the upper lumbar spine.\par \par  [] : no [FreeTextEntry7] : Right leg to the foot  [de-identified] : Getting up from a sitting position [de-identified] : 05/07/2022 Amilcar granados [place]

## 2022-06-08 NOTE — ASSESSMENT
[FreeTextEntry1] : After discussing various treatment options with the patient including but not limited to oral medications, physical therapy, exercise, modalities as well as interventional spinal injections, we have decided with the following plan:\par \par I personally reviewed the MRI/CT scan images and agree with the radiologist's report. The radiological findings were discussed with the patient.\par \par The risks, benefits, contents and alternatives to injection were explained in full to the patient. Risks outlined include but are not limited to infection,sepsis, bleeding, post-dural puncture headache, nerve damage, temporary increase in pain, syncopal episode, failure to resolve symptoms, allergic reaction, symptom recurrence, and elevation of blood sugar in diabetics. Cortisone may cause immunosuppression. Patient understands the risks. All questions were answered. After discussion of options, patient requested an injection. Information regarding the injection was given to the patient. Which medications to stop prior to the injection was explained to the patient as well.\par \par Follow up in 1-2 weeks post injection for re-evaluation. \par \par Continue Home exercises, stretching, activity modification, physical therapy, and conservative care.\par \par \par right L4/5 L5/S1 TFESI

## 2022-06-08 NOTE — PHYSICAL EXAM
[] : no thoracic paraspinal spasm [de-identified] : left DR and EHR not tested [TWNoteComboBox7] : forward flexion 45 degrees [de-identified] : extension 10 degrees

## 2022-06-08 NOTE — DATA REVIEWED
[MRI] : MRI [Lumbar Spine] : lumbar spine [Report was reviewed and noted in the chart] : The report was reviewed and noted in the chart [I independently reviewed and interpreted images and report] : I independently reviewed and interpreted images and report [I reviewed the films/CD and agree] : I reviewed the films/CD and agree [I reviewed the films/CD] : I reviewed the films/CD

## 2022-06-16 ENCOUNTER — APPOINTMENT (OUTPATIENT)
Dept: ORTHOPEDIC SURGERY | Facility: CLINIC | Age: 67
End: 2022-06-16
Payer: COMMERCIAL

## 2022-06-16 PROCEDURE — 99024 POSTOP FOLLOW-UP VISIT: CPT

## 2022-06-16 PROCEDURE — 73630 X-RAY EXAM OF FOOT: CPT | Mod: LT

## 2022-06-16 NOTE — ASSESSMENT
[FreeTextEntry1] : protected wb in cam boot\par ice/elevate\par nsaids prn\par reiterated importance of rest from activity\par f/up 3 wks w/ foot xray

## 2022-06-16 NOTE — HISTORY OF PRESENT ILLNESS
[Sudden] : sudden [8] : 8 [0] : 0 [Sharp] : sharp [Constant] : constant [Household chores] : household chores [Leisure] : leisure [Work] : work [Social interactions] : social interactions [Rest] : rest [Standing] : standing [Walking] : walking [Stairs] : stairs [Full time] : Work status: full time [de-identified] : 06/02/2022: pt states she was walking her dog and her dog started running and she fell and twisted her foot on 5/17/22. went to Urgent care and saw dr asher and was put in a boot and sent for MRI. has been wbat in boot. no prior foot probs. no dm/tob. \par 6/16/22:continued pain especially after on feet all day. not wearing boot when gets home [] : no [FreeTextEntry1] : LT foot [FreeTextEntry3] : 5/17/22 [de-identified] : boot [de-identified] : Ernesto  [de-identified] : MRI/X-ray

## 2022-06-16 NOTE — DATA REVIEWED
[MRI] : MRI [Foot] : foot [I reviewed the films/CD and additionally noted] : I reviewed the films/CD and additionally noted [FreeTextEntry1] : plantar base 2nd mt [facility]

## 2022-06-16 NOTE — PHYSICAL EXAM
[Left] : left foot and ankle [NL (20)] : dorsiflexion 20 degrees [NL (40)] : plantar flexion 40 degrees [5___] : eversion 5[unfilled]/5 [2+] : dorsalis pedis pulse: 2+ [] : patient ambulates without assistive device

## 2022-06-21 LAB — SARS-COV-2 N GENE NPH QL NAA+PROBE: NOT DETECTED

## 2022-06-23 ENCOUNTER — APPOINTMENT (OUTPATIENT)
Age: 67
End: 2022-06-23
Payer: COMMERCIAL

## 2022-06-23 PROCEDURE — 64483 NJX AA&/STRD TFRM EPI L/S 1: CPT | Mod: RT

## 2022-06-23 PROCEDURE — 64484 NJX AA&/STRD TFRM EPI L/S EA: CPT | Mod: 59,RT

## 2022-07-06 ENCOUNTER — APPOINTMENT (OUTPATIENT)
Dept: PAIN MANAGEMENT | Facility: CLINIC | Age: 67
End: 2022-07-06

## 2022-07-06 VITALS — WEIGHT: 178 LBS | BODY MASS INDEX: 29.66 KG/M2 | HEIGHT: 65 IN

## 2022-07-06 PROCEDURE — 73630 X-RAY EXAM OF FOOT: CPT | Mod: LT

## 2022-07-06 PROCEDURE — 99213 OFFICE O/P EST LOW 20 MIN: CPT

## 2022-07-06 NOTE — HISTORY OF PRESENT ILLNESS
[Lower back] : lower back [10] : 10 [Household chores] : household chores [Leisure] : leisure [Nothing helps with pain getting better] : Nothing helps with pain getting better [Standing] : standing [Occasional] : occasional [FreeTextEntry1] : 07/06/2022: follow up today from 6/23 right l4-L5, L5-S1.  Had 80% relief.  Has a boot on left foot after fracture of left foot.\par \par 06/08/2022 : Patient presents for initial evaluation. She complains of pain in the lower back with radiation down the right lateral leg to the foot. Pain got worse over the last 3 months. PT was not helping. +n/t , No weakness. She takes Advil PRN with limited relief. She saw Dr. Ojeda. she was diagnosed with a foot fracture and currently in a walking boot. \par \par Subjective Weakness:No\par Numbness/Tingling: Yes\par Bladder/Bowel dysfunction: Yes/No\par Physical Therapy: Yes\par \par \par Attempted modalities for current pain complaint:\par See above:\par Medications:No\par \par Injections: (6/23/22) L4-L5, L5-S1 TFESI\par \par Previous Spine Surgery: N/A\par \par Imaging:\par MRI Lumbar Spine (5/7/22): \par IMPRESSION:\par Multilevel lumbar spondylosis with dextroscoliosis, exaggerated lumbar lordosis, right lateral translation at L1-2 and left lateral translation at L4-5, and grade 1 anterolisthesis at L5-S1 with bilateral spondylolysis.\par \par Multilevel moderate to severe narrowing of the lateral recesses and neural foramina. Severe central stenosis at L5-S1, moderate central stenosis at L3-4 and L4-5, and milder canal narrowing in the upper lumbar spine.\par \par  [] : no [FreeTextEntry7] : Right leg to the foot  [de-identified] : Getting up from a sitting position [de-identified] : 05/07/2022 Amilcar granados [place]

## 2022-07-06 NOTE — PHYSICAL EXAM
[] : no thoracic paraspinal spasm [TWNoteComboBox7] : forward flexion 45 degrees [de-identified] : left DR and EHR not tested [de-identified] : extension 10 degrees

## 2022-07-07 ENCOUNTER — APPOINTMENT (OUTPATIENT)
Dept: ORTHOPEDIC SURGERY | Facility: CLINIC | Age: 67
End: 2022-07-07

## 2022-07-07 VITALS — BODY MASS INDEX: 29.66 KG/M2 | WEIGHT: 178 LBS | HEIGHT: 65 IN

## 2022-07-07 PROCEDURE — 99024 POSTOP FOLLOW-UP VISIT: CPT

## 2022-07-07 PROCEDURE — 73630 X-RAY EXAM OF FOOT: CPT | Mod: LT

## 2022-07-07 NOTE — ASSESSMENT
[FreeTextEntry1] : wbat\par supportive shoe\par ice/elevate\par nsaids prn\par f/up 6 wks w/ foot xray

## 2022-07-07 NOTE — HISTORY OF PRESENT ILLNESS
[Sudden] : sudden [4] : 4 [0] : 0 [Dull/Aching] : dull/aching [Constant] : constant [Household chores] : household chores [Leisure] : leisure [Work] : work [Social interactions] : social interactions [Rest] : rest [Standing] : standing [Walking] : walking [Stairs] : stairs [Full time] : Work status: full time [de-identified] : 06/02/2022: pt states she was walking her dog and her dog started running and she fell and twisted her foot on 5/17/22. went to Urgent care and saw dr asher and was put in a boot and sent for MRI. has been wbat in boot. no prior foot probs. no dm/tob. \par \par 6/16/22:continued pain especially after on feet all day. not wearing boot when gets home\par \par 07/07/2022: pain improving. WB in boot.  [] : Post Surgical Visit: no [FreeTextEntry1] : LT foot [FreeTextEntry3] : 5/17/22 [de-identified] : boot [de-identified] : Brian [de-identified] : MRI/X-ray

## 2022-07-07 NOTE — PHYSICAL EXAM
[Left] : left foot and ankle [NL (40)] : plantar flexion 40 degrees [5___] : eversion 5[unfilled]/5 [2+] : dorsalis pedis pulse: 2+ [NL 30)] : inversion 30 degrees [NL (20)] : eversion 20 degrees [] : negative anterior drawer at ankle [FreeTextEntry8] : improved

## 2022-09-01 ENCOUNTER — APPOINTMENT (OUTPATIENT)
Dept: ORTHOPEDIC SURGERY | Facility: CLINIC | Age: 67
End: 2022-09-01

## 2022-09-01 VITALS — HEIGHT: 65 IN | WEIGHT: 178 LBS | BODY MASS INDEX: 29.66 KG/M2

## 2022-09-01 PROCEDURE — 99213 OFFICE O/P EST LOW 20 MIN: CPT

## 2022-09-01 PROCEDURE — 73630 X-RAY EXAM OF FOOT: CPT | Mod: LT

## 2022-09-01 NOTE — PHYSICAL EXAM
[Left] : left foot and ankle [NL (40)] : plantar flexion 40 degrees [NL 30)] : inversion 30 degrees [NL (20)] : eversion 20 degrees [5___] : eversion 5[unfilled]/5 [2+] : dorsalis pedis pulse: 2+ [] : negative anterior drawer at ankle [FreeTextEntry8] : improved

## 2022-09-01 NOTE — HISTORY OF PRESENT ILLNESS
[Sudden] : sudden [4] : 4 [0] : 0 [Dull/Aching] : dull/aching [Constant] : constant [Household chores] : household chores [Leisure] : leisure [Work] : work [Social interactions] : social interactions [Rest] : rest [Standing] : standing [Walking] : walking [Stairs] : stairs [Full time] : Work status: full time [de-identified] : 06/02/2022: pt states she was walking her dog and her dog started running and she fell and twisted her foot on 5/17/22. went to Urgent care and saw dr asher and was put in a boot and sent for MRI. has been wbat in boot. no prior foot probs. no dm/tob. \par \par 6/16/22:continued pain especially after on feet all day. not wearing boot when gets home\par \par 07/07/2022: pain improving. WB in boot. \par \par 09/01/2022: walking in reg shoes. having some swelling. having plantar foot pain [] : Post Surgical Visit: no [FreeTextEntry1] : LT foot [FreeTextEntry3] : 5/17/22 [de-identified] : Brian [de-identified] : MRI/X-ray

## 2022-09-14 ENCOUNTER — APPOINTMENT (OUTPATIENT)
Dept: PAIN MANAGEMENT | Facility: CLINIC | Age: 67
End: 2022-09-14

## 2022-09-14 VITALS — HEIGHT: 65 IN | WEIGHT: 178 LBS | BODY MASS INDEX: 29.66 KG/M2

## 2022-09-14 PROCEDURE — 99214 OFFICE O/P EST MOD 30 MIN: CPT

## 2022-09-14 NOTE — PHYSICAL EXAM
[] : no thoracic paraspinal spasm [4___] : right hip flexion 4[unfilled]/5 [de-identified] : left DR and EHR not tested [TWNoteComboBox7] : forward flexion 60 degrees [de-identified] : extension 10 degrees

## 2022-09-14 NOTE — ASSESSMENT
[FreeTextEntry1] : After discussing various treatment options with the patient including but not limited to oral medications, physical therapy, exercise, modalities as well as interventional spinal injections, we have decided with the following plan:\par \par 1) Intervention Injection Therapy:\par I personally reviewed the MRI/CT scan images and agree with the radiologist's report. The radiological findings were discussed with the patient.\par The risks, benefits, contents and alternatives to injection were explained in full to the patient. Risks outlined include but are not limited to infection,sepsis, bleeding, post-dural puncture headache, nerve damage, temporary increase in pain, syncopal episode, failure to resolve symptoms, allergic reaction, symptom recurrence, and elevation of blood sugar in diabetics. Cortisone may cause immunosuppression. Patient understands the risks. All questions were answered. After discussion of options, patient requested an injection. Information regarding the injection was given to the patient. Which medications to stop prior to the injection was explained to the patient as well.\par \par Follow up in 1-2 weeks post injection for re-evaluation. \par Continue Home exercises, stretching, activity modification, physical therapy, and conservative care.\par \par Patient is presenting with acute/sub-acute radicular pain with impairment in ADLs and functionality.  The pain has not responded to  conservative care including nsaid therapy and/or physical therapy.  There is no bleeding tendency, unstable medical condition, or systemic infection.\par \par Right L4/5 L5/s1 \par \par 2) I would recommend a trial of neuropathic medication as patient presents with signs of nerve irritation. (ie burning, paresthesias etc) Goals of therapy would be to improve pain and overall QOL. Side effects reviewed with patient. Patient will call or stop medication if given side effects occur.

## 2022-09-20 ENCOUNTER — NON-APPOINTMENT (OUTPATIENT)
Age: 67
End: 2022-09-20

## 2022-10-06 ENCOUNTER — APPOINTMENT (OUTPATIENT)
Dept: RHEUMATOLOGY | Facility: CLINIC | Age: 67
End: 2022-10-06

## 2022-10-06 VITALS
HEIGHT: 65 IN | HEART RATE: 76 BPM | BODY MASS INDEX: 27.49 KG/M2 | OXYGEN SATURATION: 99 % | DIASTOLIC BLOOD PRESSURE: 80 MMHG | TEMPERATURE: 97.2 F | SYSTOLIC BLOOD PRESSURE: 120 MMHG | WEIGHT: 165 LBS

## 2022-10-06 DIAGNOSIS — Z82.61 FAMILY HISTORY OF ARTHRITIS: ICD-10-CM

## 2022-10-06 DIAGNOSIS — M25.50 PAIN IN UNSPECIFIED JOINT: ICD-10-CM

## 2022-10-06 DIAGNOSIS — Z78.0 ASYMPTOMATIC MENOPAUSAL STATE: ICD-10-CM

## 2022-10-06 DIAGNOSIS — S92.322D DISPLACED FRACTURE OF SECOND METATARSAL BONE, LEFT FOOT, SUBSEQUENT ENCOUNTER FOR FRACTURE WITH ROUTINE HEALING: ICD-10-CM

## 2022-10-06 DIAGNOSIS — R68.2 DRY MOUTH, UNSPECIFIED: ICD-10-CM

## 2022-10-06 PROCEDURE — 99205 OFFICE O/P NEW HI 60 MIN: CPT

## 2022-10-06 NOTE — REASON FOR VISIT
[Consultation] : a consultation visit [FreeTextEntry1] : joint pain; LBP; hx of fracture; bone health

## 2022-10-06 NOTE — PHYSICAL EXAM
[General Appearance - Alert] : alert [General Appearance - In No Acute Distress] : in no acute distress [Sclera] : the sclera and conjunctiva were normal [Extraocular Movements] : extraocular movements were intact [Outer Ear] : the ears and nose were normal in appearance [Neck Appearance] : the appearance of the neck was normal [Respiration, Rhythm And Depth] : normal respiratory rhythm and effort [Heart Rate And Rhythm] : heart rate was normal and rhythm regular [Heart Sounds] : normal S1 and S2 [Abdomen Soft] : soft [Abdomen Tenderness] : non-tender [Cervical Lymph Nodes Enlarged Anterior Bilaterally] : anterior cervical [Supraclavicular Lymph Nodes Enlarged Bilaterally] : supraclavicular [No CVA Tenderness] : no ~M costovertebral angle tenderness [Motor Tone] : muscle strength and tone were normal [] : no rash [No Focal Deficits] : no focal deficits [Impaired Insight] : insight and judgment were intact [Mood] : the mood was normal [FreeTextEntry1] : Rt medial epicondylitis; heberden nodes at DIPs; No synovitis or effusion on exam noted today; Good ROM in b/l shoulders, no pelvic/girdle stiffness and able to stand up without using her hands

## 2022-10-06 NOTE — ASSESSMENT
[FreeTextEntry1] : 67-year-old female, here for the first time reports of RA in mother; w/ OA hands; reports of intermittent joint aches incld in the Rt medial elbow today, LBP to rule out inflammatory arthropathy incld RA, seronegative spondyloarthropathy.\par Reports of dry mouth and will check Sjogren abs also. \par -No synovitis or effusion on exam noted today and advised to monitor.\par -labs as below incld ESR, CRP, serologies, TSH\par -dry mouth: discussed biotene mouthwash or toothpaste PRN; check Sjogren abs\par \par Rt medial epicondylitis: discussed rest and using Ace band below elbow to rest it \par -discussed she can consider steroid injection if needed\par \par Cervicalgia: intermittent; not much pain today\par -reviewed MRI c-spine 1/10/22 w/ multilevel cervical spondylosis \par \par LBP: intermittent\par  -reviewed MRI L-spine 5/14/22 w/ multilevel lumbar spondylosis w/ dextroscoliosis \par -Referred for xray of SI to confirm SI normal \par -Motrin PRN w/ food needed sparingly helps\par -defers PT stating it did not help much\par -doing epidural injections w/ pain management, Dr. Sterling\par -reports was given gabapentin 300 mg TID that she states she took for a week and stopped because it did not help & will plan to restart & take for longer to see if it helps \par \par Bone health: postmenopausal patient reports no Dexa in past few yrs w/ Dexa referral given today to evaluate for osteopenia/osteoporosis.\par -states she has Lt foot 2nd metatarsal fracture after mechanical fall \par \par -educated on symptoms to monitor for in detail and alert us if any concerns.\par -knows to stay up to date on health maintenance w/ PCP\par -f/u in 10-14 days w/ labs, xrays, Dexa please\par

## 2022-10-06 NOTE — HISTORY OF PRESENT ILLNESS
[FreeTextEntry1] : 67-year-old female, here for the first time reports of RA in mother; reports of intermittent neck pain and back pain for the past few years.\par Patient states she can notice some right elbow soreness especially on the medial aspect.  She does not recall any injury or trauma.\par Patient states she does not have much now with history of degenerative arthritis of it.\par States she notices lower back pain worse with standing; and unsure what makes it better. Denies any lossof bowel incontinence or saddle anesthesias.\par States PT does not help much.\par States she sees pain management Dr. Enriquez and is getting epidural injections.\par States she takes Motrin 600 as needed w/ food that helps with the pain intermittently.\par Denies any fever/chills, no rashes, no ulcers, no dry eyes, + dry mouth, no raynaud's, no infectious diarrhea or  symptoms at this time.\par \par States she has a history of a left foot second metatarsal fracture after mechanical fall.  States she has not had a bone density in many years and would be interested in finding out about her bone health.

## 2022-10-13 ENCOUNTER — APPOINTMENT (OUTPATIENT)
Dept: RADIOLOGY | Facility: HOSPITAL | Age: 67
End: 2022-10-13

## 2022-10-13 ENCOUNTER — RESULT REVIEW (OUTPATIENT)
Age: 67
End: 2022-10-13

## 2022-10-13 ENCOUNTER — OUTPATIENT (OUTPATIENT)
Dept: OUTPATIENT SERVICES | Facility: HOSPITAL | Age: 67
LOS: 1 days | End: 2022-10-13
Payer: COMMERCIAL

## 2022-10-13 DIAGNOSIS — Z78.0 ASYMPTOMATIC MENOPAUSAL STATE: ICD-10-CM

## 2022-10-13 DIAGNOSIS — M54.50 LOW BACK PAIN, UNSPECIFIED: ICD-10-CM

## 2022-10-13 PROCEDURE — 77080 DXA BONE DENSITY AXIAL: CPT | Mod: 26

## 2022-10-13 PROCEDURE — 77080 DXA BONE DENSITY AXIAL: CPT

## 2022-10-13 PROCEDURE — 72202 X-RAY EXAM SI JOINTS 3/> VWS: CPT | Mod: 26

## 2022-10-13 PROCEDURE — 72202 X-RAY EXAM SI JOINTS 3/> VWS: CPT

## 2022-10-14 ENCOUNTER — APPOINTMENT (OUTPATIENT)
Dept: PAIN MANAGEMENT | Facility: CLINIC | Age: 67
End: 2022-10-14

## 2022-10-14 PROCEDURE — 64484 NJX AA&/STRD TFRM EPI L/S EA: CPT | Mod: RT

## 2022-10-14 PROCEDURE — 64483 NJX AA&/STRD TFRM EPI L/S 1: CPT | Mod: RT

## 2022-10-14 NOTE — HISTORY OF PRESENT ILLNESS
[FreeTextEntry1] : Date of Service: 10/14/2022 \par \par Account: [id number]\par \par Patient: VIKTOR CHUN \par \par YOB: 1955\par \par Age: 67 year\par \par \par Surgeon: Glenna Sterling M.D.\par \par Assistant: None.\par \par Pre-Operative Diagnosis: Lumbosacral Radiculitis (M54.17)\par \par Post Operative Diagnosis: Lumbosacral Radiculitis (M54.17)\par \par Procedure: Right L4-5, L5-S1 transforaminal epidural steroid injection under fluoroscopic guidance.\par \par Anesthesia:             MAC\par \par \par This procedure was carried out using fluoroscopic guidance.  The risks and benefits of the procedure were discussed extensively with the patient.  The consent of the patient was obtained and the following procedure was performed. The patient was placed in the prone position on the fluoroscopic table and the lumbar area was prepped and draped in a sterile fashion.\par \par The right L4-5 and L5-S1 neural foramen were identified on right oblique  "che dog" anatomical view at the 6 o' clock position using fluoroscopic guidance, and the area was marked. The overlying skin and subcutaneous structures were anesthetized using sterile technique with 1% Lidocaine.  A 22 gauge spinal needle was directed toward the inferior (6o'clock) position of the pedicle, which formed the roof of the identified foramen.  Once in the epidural space, after negative aspiration for heme and CSF, 1cc of Omnipaque contrast was injected to confirm epidural location and assess filling defects and rule out intravascular needle placement. \par \par The following contrast flow and epidurogram was observed: no intravascular or intrathecal flow pattern was noted.  No blood or CSF was aspirated. Omnipaque spread appeared to outline the right L4 and L5 nerve roots and spread medially into the epidural space.  \par \par After this, an injectate of 3 cc preservative free normal saline plus 40 mg of Kenalog was injected in the epidural space at each of the two levels.\par \par The needle was subsequently removed.  Vital signs remained normal.  Pulse oximeter was used throughout the procedure and the patient's pulse and oxygen saturation remained within normal limits.  The patient tolerated the procedure well.  There were no complications.  The patient was instructed to apply ice over the injection sites for twenty minutes every two hours for the next 24 to 48 hours.  The patient was also instructed to contact me immediately if there were any problems.\par \ashley Sterling M.D.\par \par

## 2022-10-17 ENCOUNTER — NON-APPOINTMENT (OUTPATIENT)
Age: 67
End: 2022-10-17

## 2022-10-20 ENCOUNTER — APPOINTMENT (OUTPATIENT)
Dept: ORTHOPEDIC SURGERY | Facility: CLINIC | Age: 67
End: 2022-10-20

## 2022-10-20 VITALS — HEIGHT: 65 IN | WEIGHT: 165 LBS | BODY MASS INDEX: 27.49 KG/M2

## 2022-10-20 DIAGNOSIS — M76.822 POSTERIOR TIBIAL TENDINITIS, LEFT LEG: ICD-10-CM

## 2022-10-20 DIAGNOSIS — M72.2 PLANTAR FASCIAL FIBROMATOSIS: ICD-10-CM

## 2022-10-20 PROCEDURE — 99213 OFFICE O/P EST LOW 20 MIN: CPT

## 2022-10-20 NOTE — HISTORY OF PRESENT ILLNESS
[Sudden] : sudden [0] : 0 [Rest] : rest [Full time] : Work status: full time [de-identified] : 06/02/2022: pt states she was walking her dog and her dog started running and she fell and twisted her foot on 5/17/22. went to Urgent care and saw dr asher and was put in a boot and sent for MRI. has been wbat in boot. no prior foot probs. no dm/tob. \par \par 6/16/22:continued pain especially after on feet all day. not wearing boot when gets home\par \par 07/07/2022: pain improving. WB in boot. \par \par 09/01/2022: walking in reg shoes. having some swelling. having plantar foot pain.\par \par 10/20/2022: no pain. did not go to PT.  [] : Post Surgical Visit: no [FreeTextEntry1] : LT foot [FreeTextEntry3] : 5/17/22 [de-identified] : Brian [de-identified] : MRI/X-ray

## 2022-10-20 NOTE — PHYSICAL EXAM
[Left] : left foot and ankle [NL (40)] : plantar flexion 40 degrees [NL 30)] : inversion 30 degrees [NL (20)] : eversion 20 degrees [5___] : eversion 5[unfilled]/5 [2+] : dorsalis pedis pulse: 2+ [] : non-antalgic [FreeTextEntry8] : improved

## 2022-11-02 ENCOUNTER — APPOINTMENT (OUTPATIENT)
Dept: PAIN MANAGEMENT | Facility: CLINIC | Age: 67
End: 2022-11-02

## 2022-11-02 VITALS — BODY MASS INDEX: 27.49 KG/M2 | HEIGHT: 65 IN | WEIGHT: 165 LBS

## 2022-11-02 PROCEDURE — 99213 OFFICE O/P EST LOW 20 MIN: CPT

## 2022-11-02 NOTE — HISTORY OF PRESENT ILLNESS
[Lower back] : lower back [Dull/Aching] : dull/aching [Sharp] : sharp [Tingling] : tingling [Sleep] : sleep [Rest] : rest [Nothing helps with pain getting better] : Nothing helps with pain getting better [Standing] : standing [3] : 3 [1] : 2 [Intermittent] : intermittent [Meds] : meds [Injection therapy] : injection therapy [FreeTextEntry1] : 11/2/22: f/u for right L4-5, L5-S1 TFESI on 10/14.   Had 80% relief from pain down right leg.  Has  tingling in left leg.  Increase gabapentin to TID.  \par \par 09/14/2022: follow up today after right L4/5 L5/S1 TFESI on 6/23/22. had good relief for about a month. (75%  or so) pain in the lower back with radiation  + n/t pain worse with standing. \par \par 07/06/2022: follow up today from 6/23 right l4-L5, L5-S1. Had 80% relief. Has a boot on left foot after fracture of left foot.\par \par 06/08/2022 : Patient presents for initial evaluation. She complains of pain in the lower back with radiation down the right lateral leg to the foot. Pain got worse over the last 3 months. PT was not helping. +n/t , No weakness. She takes Advil PRN with limited relief. She saw Dr. Ojeda. she was diagnosed with a foot fracture and currently in a walking boot. \par \par Subjective Weakness:No\par Numbness/Tingling: Yes\par Bladder/Bowel dysfunction: Yes/No\par Physical Therapy: Yes\par \par \par Attempted modalities for current pain complaint:\par See above:\par Medications:No\par \par Injections: right L4/5 L5/S1 (6/23/22, 10/14/22)\par \par Previous Spine Surgery: N/A\par \par Imaging:\par MRI Lumbar Spine (5/7/22): \par IMPRESSION:\par Multilevel lumbar spondylosis with dextroscoliosis, exaggerated lumbar lordosis, right lateral translation at L1-2 and left lateral translation at L4-5, and grade 1 anterolisthesis at L5-S1 with bilateral spondylolysis.\par \par Multilevel moderate to severe narrowing of the lateral recesses and neural foramina. Severe central stenosis at L5-S1, moderate central stenosis at L3-4 and L4-5, and milder canal narrowing in the upper lumbar spine.\par \par  [] : no [FreeTextEntry6] : tender  [de-identified] : Getting up from a sitting position [de-identified] : 05/07/2022 Amilcar granados [place]

## 2022-11-02 NOTE — PHYSICAL EXAM
[4___] : right hip flexion 4[unfilled]/5 [] : no thoracic paraspinal spasm [de-identified] : left DR and EHR not tested [TWNoteComboBox7] : forward flexion 60 degrees [de-identified] : extension 10 degrees

## 2022-11-21 ENCOUNTER — EMERGENCY (EMERGENCY)
Facility: HOSPITAL | Age: 67
LOS: 1 days | Discharge: HOME | End: 2022-11-21
Attending: EMERGENCY MEDICINE | Admitting: EMERGENCY MEDICINE
Payer: COMMERCIAL

## 2022-11-21 VITALS
RESPIRATION RATE: 15 BRPM | DIASTOLIC BLOOD PRESSURE: 90 MMHG | OXYGEN SATURATION: 94 % | HEART RATE: 81 BPM | WEIGHT: 164.91 LBS | SYSTOLIC BLOOD PRESSURE: 148 MMHG | TEMPERATURE: 97 F | HEIGHT: 65 IN

## 2022-11-21 PROCEDURE — 73502 X-RAY EXAM HIP UNI 2-3 VIEWS: CPT

## 2022-11-21 PROCEDURE — 73110 X-RAY EXAM OF WRIST: CPT | Mod: 26,LT

## 2022-11-21 PROCEDURE — 99284 EMERGENCY DEPT VISIT MOD MDM: CPT

## 2022-11-21 PROCEDURE — 72100 X-RAY EXAM L-S SPINE 2/3 VWS: CPT

## 2022-11-21 PROCEDURE — 73110 X-RAY EXAM OF WRIST: CPT

## 2022-11-21 PROCEDURE — 73502 X-RAY EXAM HIP UNI 2-3 VIEWS: CPT | Mod: 26,LT

## 2022-11-21 PROCEDURE — 72100 X-RAY EXAM L-S SPINE 2/3 VWS: CPT | Mod: 26

## 2022-11-21 RX ORDER — ACETAMINOPHEN 500 MG
650 TABLET ORAL ONCE
Refills: 0 | Status: DISCONTINUED | OUTPATIENT
Start: 2022-11-21 | End: 2022-11-25

## 2022-11-21 NOTE — ED PROVIDER NOTE - OBJECTIVE STATEMENT
Dr. Dunn: 67F h/o epidurals in the past due to scoliosis, here with left wrist and hip/lumbar pain s/p slip and fall on ice, no head injury, no LOC. Occurred outside post office at 9am, came in now because boss recommended for workman's comp. 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  pt is right handed, no blood thinners.

## 2022-11-21 NOTE — ED PROVIDER NOTE - MUSCULOSKELETAL, MLM
Pelvis stable, no midline spinal ttp, no laxity of left knee, neg ant/post drawer test, normal ROM of left hip with minimal pain. + minimal ttp over left distal wrist over ulna, no snuffbox ttp, +radial pulse, neurovasc intact, no elbow, shoulder, humerus, clavicle ttp.

## 2022-11-21 NOTE — ED PROVIDER NOTE - CLINICAL SUMMARY MEDICAL DECISION MAKING FREE TEXT BOX
Pt with mechanical fall, will image, pain ctrl, reassess Pt with mechanical fall, will image, pain ctrl, reassess.

## 2022-11-21 NOTE — ED PROVIDER NOTE - CARE PROVIDER_API CALL
Jose Dumont)  Orthopaedic Sports Medicine; Orthopaedic Surgery  [address] [address]  Phone: [phone]  Fax: [phone]  Follow Up Time:

## 2022-11-21 NOTE — ED PROVIDER NOTE - NSFOLLOWUPINSTRUCTIONS_ED_ALL_ED_FT
Follow up with your doctor. Follow up with an orthopedist as needed.    Tylenol or motrin as directed for pain.    Ice as often as 20 minutes every 1-2 hours as needed.    Return to ED for any concerns.        Fall Prevention for Adults    WHAT YOU NEED TO KNOW:    As you age, your muscles weaken and your risk for falls increases. Your risk also increases if you take medicines that make you sleepy or dizzy. You may also be at risk if you have vision or joint problems, have low blood pressure, or are not active.    DISCHARGE INSTRUCTIONS:    Call 911 or have someone else call if:   •You have fallen and are unconscious.      •You have fallen and cannot move part of your body.      Contact your healthcare provider if:   •You have fallen and have pain or a headache.      •You have questions or concerns about your condition or care.      Fall prevention tips:   •Stay active. Exercise can help strengthen your muscles and improve your balance. Your healthcare provider may recommend water aerobics, walking, or Syl[employer] Chi. He or she may also recommend physical therapy to improve your coordination. Never start an exercise program without asking your healthcare provider first.  Water Aerobics for Seniors       Sylvester [employer] for Seniors           •Wear shoes that fit well and have soles that . Wear shoes both inside and outside. Use slippers with good . Avoid shoes with high heels.      •Use assistive devices as directed. Your healthcare provider may suggest that you use a cane or walker to help you keep your balance. You may need to have grab bars put in your bathroom near the toilet or in the shower.      •Stand or sit up slowly. This may help you keep your balance and prevent falls.      •Wear a personal alarm. This is a device that allows you to call 911 if you need help. Ask for more information on personal alarms.      •Manage your medical conditions.  Keep all appointments with your healthcare providers. Visit your eye doctor as directed.      Home safety tips:     Fall Prevention for Seniors     •Add items to prevent falls in the bathroom. Put nonslip strips on your bath or shower floor to prevent you from slipping. Use a bath mat if you do not have carpet in the bathroom. This will prevent you from falling when you step out of the bath or shower. Use a shower seat so you do not need to stand while you shower. Sit on the toilet or a chair in your bathroom to dry yourself and put on clothing. This will prevent you from losing your balance from drying or dressing yourself while you are standing.      •Keep paths clear. Remove books, shoes, and other objects from walkways and stairs. Place cords for telephones and lamps out of the way so that you do not need to walk over them. Tape them down if you cannot move them. Remove small rugs. If you cannot remove a rug, secure it with double-sided tape. This will prevent you from tripping.      •Install bright lights in your home. Use night lights to help light paths to the bathroom or kitchen. Always turn on the light before you start walking.      •Keep items you use often on shelves within reach. Do not use a step stool to help you reach an item.      •Paint or place reflective tape on the edges of your stairs. This will help you see the stairs better.      Follow up with your healthcare provider as directed: Write down your questions so you remember to ask them during your visits.

## 2022-11-21 NOTE — ED PROVIDER NOTE - MDM ORDERS SUBMITTED SELECTION
Problem: Wound:  Goal: Will show signs of wound healing; wound closure and no evidence of infection  Description  Will show signs of wound healing; wound closure and no evidence of infection  Outcome: Ongoing     Problem: Falls - Risk of:  Goal: Will remain free from falls  Description  Will remain free from falls  Outcome: Ongoing     Problem: Blood Glucose:  Goal: Ability to maintain appropriate glucose levels will improve  Description  Ability to maintain appropriate glucose levels will improve  Outcome: Ongoing Imaging Studies

## 2022-11-21 NOTE — ED PROVIDER NOTE - ATTENDING APP SHARED VISIT CONTRIBUTION OF CARE
Dr. Dunn: I performed a face to face bedside interview with patient regarding history of present illness, review of symptoms and past medical history. I completed an independent physical exam.  I have discussed patient's plan of care with PA.   I agree with note as stated above, having amended the EMR as needed to reflect my findings.   This includes HISTORY OF PRESENT ILLNESS, HIV, PAST MEDICAL/SURGICAL/FAMILY/SOCIAL HISTORY, ALLERGIES AND HOME MEDICATIONS, REVIEW OF SYSTEMS, PHYSICAL EXAM, and any PROGRESS NOTES during the time I functioned as the attending physician for this patient.    Dr. Dunn: This H&P has been written by myself in its entirety

## 2022-11-21 NOTE — ED PROVIDER NOTE - CARE PLAN
Principal Discharge DX:	Injury of left forearm and wrist  Secondary Diagnosis:	Injury of left hip   1

## 2022-11-21 NOTE — ED PROVIDER NOTE - PATIENT PORTAL LINK FT
You can access the FollowMyHealth Patient Portal offered by [facility] by registering at the following website: [email or web address]. By joining "[employer]’s FollowMyHealth portal, you will also be able to view your health information using other applications (apps) compatible with our system.

## 2022-11-21 NOTE — ED ADULT TRIAGE NOTE - CHIEF COMPLAINT QUOTE
slip and fall on ice this morning with c/o left sided pain to arm and thigh. Denies hitting head or any LOC on fall. Denies taking any blood thinners.

## 2022-11-21 NOTE — ED PROVIDER NOTE - PROGRESS NOTE DETAILS
Patient in no apparent distress, reports no pain on movement of wrist, discussed xrays with no fracture noted, declined offer of removable velcro wrist splint, agreed with plan to dc home.

## 2022-11-24 ENCOUNTER — NON-APPOINTMENT (OUTPATIENT)
Age: 67
End: 2022-11-24

## 2022-11-28 NOTE — CHART NOTE - NSCHARTNOTEFT_GEN_A_CORE
SW placed call to patient to discuss and assist with follow up care.  Patient presented to ED on 11/21/22 due to fall.  Patient reports she is waiting on the workmens comp claim number and will be scheduling follow up appt with orthopedist, reports she has contacted the office already.  Declined needing any other SW assistance at this time.  SW encouraged to call ED SW if further assistance is needed.

## 2023-01-19 ENCOUNTER — APPOINTMENT (OUTPATIENT)
Dept: ULTRASOUND IMAGING | Facility: HOSPITAL | Age: 68
End: 2023-01-19

## 2023-01-26 ENCOUNTER — APPOINTMENT (OUTPATIENT)
Dept: CT IMAGING | Facility: HOSPITAL | Age: 68
End: 2023-01-26
Payer: MEDICARE

## 2023-01-26 ENCOUNTER — OUTPATIENT (OUTPATIENT)
Dept: OUTPATIENT SERVICES | Facility: HOSPITAL | Age: 68
LOS: 1 days | End: 2023-01-26
Payer: MEDICARE

## 2023-01-26 DIAGNOSIS — R91.1 SOLITARY PULMONARY NODULE: ICD-10-CM

## 2023-01-26 PROBLEM — M41.9 SCOLIOSIS, UNSPECIFIED: Chronic | Status: ACTIVE | Noted: 2022-11-21

## 2023-01-26 PROCEDURE — 71250 CT THORAX DX C-: CPT

## 2023-01-26 PROCEDURE — 71250 CT THORAX DX C-: CPT | Mod: 26

## 2023-01-31 NOTE — DATA REVIEWED
[MRI] : MRI [Lumbar Spine] : lumbar spine [I independently reviewed and interpreted images and report] : I independently reviewed and interpreted images and report [Report was reviewed and noted in the chart] : The report was reviewed and noted in the chart [I reviewed the films/CD and agree] : I reviewed the films/CD and agree [I reviewed the films/CD] : I reviewed the films/CD

## 2023-02-01 ENCOUNTER — APPOINTMENT (OUTPATIENT)
Dept: PAIN MANAGEMENT | Facility: CLINIC | Age: 68
End: 2023-02-01
Payer: MEDICARE

## 2023-02-01 VITALS — BODY MASS INDEX: 25.83 KG/M2 | HEIGHT: 65 IN | WEIGHT: 155 LBS

## 2023-02-01 PROCEDURE — 99214 OFFICE O/P EST MOD 30 MIN: CPT

## 2023-02-01 NOTE — PHYSICAL EXAM
[] : no thoracic paraspinal spasm [TWNoteComboBox7] : forward flexion 75 degrees [de-identified] : extension 10 degrees

## 2023-02-01 NOTE — ASSESSMENT
[FreeTextEntry1] : After discussing various treatment options with the patient including but not limited to oral medications, physical therapy, exercise, modalities as well as interventional spinal injections, we have decided with the following plan:\par \par 1) Intervention Injection Therapy:\par I personally reviewed the MRI/CT scan images and agree with the radiologist's report. The radiological findings were discussed with the patient.\par The risks, benefits, contents and alternatives to injection were explained in full to the patient. Risks outlined include but are not limited to infection,sepsis, bleeding, post-dural puncture headache, nerve damage, temporary increase in pain, syncopal episode, failure to resolve symptoms, allergic reaction, symptom recurrence, and elevation of blood sugar in diabetics. Cortisone may cause immunosuppression. Patient understands the risks. All questions were answered. After discussion of options, patient requested an injection. Information regarding the injection was given to the patient. Which medications to stop prior to the injection was explained to the patient as well.\par \par Follow up in 1-2 weeks post injection for re-evaluation. \par Continue Home exercises, stretching, activity modification, physical therapy, and conservative care.\par \par Patient is presenting with acute/sub-acute radicular pain with impairment in ADLs and functionality.  The pain has not responded to  conservative care including nsaid therapy and/or physical therapy.  There is no bleeding tendency, unstable medical condition, or systemic infection. The purpose of the spinal injections is to facilitate active therapy by providing short term relief through reduction of pain and inflammation. \par \par Injections, by themselves, are not likely to provide long-term relief. Rather, active rehabilitation with modified work achieves long-term relief by increasing active ROM, strength and stability. \par \par Caudal JUSTIN

## 2023-02-01 NOTE — HISTORY OF PRESENT ILLNESS
[Lower back] : lower back [1] : 2 [Dull/Aching] : dull/aching [Sharp] : sharp [Tingling] : tingling [Rest] : rest [Meds] : meds [Injection therapy] : injection therapy [Nothing helps with pain getting better] : Nothing helps with pain getting better [Standing] : standing [7] : 7 [Radiating] : radiating [Constant] : constant [FreeTextEntry1] : 02/1/23: follow up today . pain across the lower the back and down the posterior right leg. +n/t in the leg. Still takin gabapentin BID. \par \par 11/2/22: f/u for right L4-5, L5-S1 TFESI on 10/14.   Had 80% relief from pain down right leg.  Has  tingling in left leg.  Increase gabapentin to TID.  \par \par 09/14/2022: follow up today after right L4/5 L5/S1 TFESI on 6/23/22. had good relief for about a month. (75%  or so) pain in the lower back with radiation  + n/t pain worse with standing. \par \par 07/06/2022: follow up today from 6/23 right l4-L5, L5-S1. Had 80% relief. Has a boot on left foot after fracture of left foot.\par \par 06/08/2022 : Patient presents for initial evaluation. She complains of pain in the lower back with radiation down the right lateral leg to the foot. Pain got worse over the last 3 months. PT was not helping. +n/t , No weakness. She takes Advil PRN with limited relief. She saw Dr. Ojeda. she was diagnosed with a foot fracture and currently in a walking boot. \par \par Subjective Weakness:No\par Numbness/Tingling: Yes\par Bladder/Bowel dysfunction: Yes/No\par Physical Therapy: Yes\par \par \par Attempted modalities for current pain complaint:\par See above:\par Medications:No\par \par Injections: right L4/5 L5/S1 (6/23/22, 10/14/22)\par \par Previous Spine Surgery: N/A\par \par Imaging:\par MRI Lumbar Spine (5/7/22): \par IMPRESSION:\par Multilevel lumbar spondylosis with dextroscoliosis, exaggerated lumbar lordosis, right lateral translation at L1-2 and left lateral translation at L4-5, and grade 1 anterolisthesis at L5-S1 with bilateral spondylolysis.\par \par Multilevel moderate to severe narrowing of the lateral recesses and neural foramina. Severe central stenosis at L5-S1, moderate central stenosis at L3-4 and L4-5, and milder canal narrowing in the upper lumbar spine.\par \par  [] : no [FreeTextEntry6] : tender  [FreeTextEntry7] : right leg  [de-identified] : Getting up from a sitting position [de-identified] : 05/07/2022 Amilcar granados [place]

## 2023-02-15 ENCOUNTER — APPOINTMENT (OUTPATIENT)
Dept: RHEUMATOLOGY | Facility: CLINIC | Age: 68
End: 2023-02-15
Payer: MEDICARE

## 2023-02-15 VITALS
TEMPERATURE: 98.1 F | HEART RATE: 84 BPM | HEIGHT: 64 IN | WEIGHT: 155 LBS | SYSTOLIC BLOOD PRESSURE: 130 MMHG | OXYGEN SATURATION: 97 % | BODY MASS INDEX: 26.46 KG/M2 | DIASTOLIC BLOOD PRESSURE: 80 MMHG

## 2023-02-15 DIAGNOSIS — Z71.2 PERSON CONSULTING FOR EXPLANATION OF EXAMINATION OR TEST FINDINGS: ICD-10-CM

## 2023-02-15 PROCEDURE — 20610 DRAIN/INJ JOINT/BURSA W/O US: CPT | Mod: LT

## 2023-02-15 PROCEDURE — 99214 OFFICE O/P EST MOD 30 MIN: CPT | Mod: 25

## 2023-02-15 RX ORDER — TRIAMCINOLONE ACETONIDE 40 MG/ML
40 SUSPENSION INTRA-ARTERIAL; INTRAMUSCULAR
Qty: 1 | Refills: 0 | Status: COMPLETED | OUTPATIENT
Start: 2023-02-15

## 2023-02-15 RX ADMIN — TRIAMCINOLONE ACETONIDE 0 MG/ML: 40 INJECTION, SUSPENSION INTRA-ARTICULAR; INTRAMUSCULAR at 00:00

## 2023-02-15 NOTE — PHYSICAL EXAM
[General Appearance - Alert] : alert [General Appearance - In No Acute Distress] : in no acute distress [Sclera] : the sclera and conjunctiva were normal [Extraocular Movements] : extraocular movements were intact [Outer Ear] : the ears and nose were normal in appearance [Neck Appearance] : the appearance of the neck was normal [Respiration, Rhythm And Depth] : normal respiratory rhythm and effort [Heart Rate And Rhythm] : heart rate was normal and rhythm regular [Heart Sounds] : normal S1 and S2 [Abdomen Soft] : soft [Abdomen Tenderness] : non-tender [Cervical Lymph Nodes Enlarged Anterior Bilaterally] : anterior cervical [Supraclavicular Lymph Nodes Enlarged Bilaterally] : supraclavicular [No CVA Tenderness] : no ~M costovertebral angle tenderness [Motor Tone] : muscle strength and tone were normal [] : no rash [No Focal Deficits] : no focal deficits [Impaired Insight] : insight and judgment were intact [Mood] : the mood was normal [FreeTextEntry1] : No synovitis or effusion on exam noted today; Lt shoulder tenderness w/ rotation w/ good ROM; no right shoulder tenderness w/ good ROM; able to stand up w/o using her hands

## 2023-02-15 NOTE — HISTORY OF PRESENT ILLNESS
[FreeTextEntry1] : 67-year-old female, here for the first follow up reports of RA in mother; reports of intermittent neck pain and back pain for the past few years.\par States she notices lower back pain worse with standing; and unsure what makes it better. Denies any loss of bowel incontinence or saddle anesthesias.\par States PT does not help much.\par States she sees pain management Dr. Enriquez and is getting epidural injections.\par States she takes gabapentin to help the pain. \par Denies neck pain today.\par Denies any swelling or redness or warmth of any joints.\par Denies any fever/chills, no rashes, no ulcers, no dry eyes, + dry mouth at times, no raynaud's, no infectious diarrhea or  symptoms at this time.\par \par Today she states she has achy left shoulder pain rated 8/10 for severity w/ rotation and would like steroid injection for it.\par Has good ROM in b/l shoulders, no pelvic/girdle stiffness and able to stand up without using her hands, no temporal pain/unremitting headaches, no vision changes, no jaw pain.\par \par States she has a history of a left foot second metatarsal fracture after mechanical fall. States she did Dexa to review in detail. \par  \par

## 2023-02-15 NOTE — REASON FOR VISIT
[Follow-Up: _____] : a [unfilled] follow-up visit [FreeTextEntry1] : OA; DDD; review labs/xrays/Dexa; Lt shoulder pain

## 2023-02-15 NOTE — PROCEDURE
[Today's Date:] : Date: [unfilled] [Risks] : risks [Benefits] : benefits [Alternatives] : alternatives [Consent Obtained] : written consent was obtained prior to the procedure and is detailed in the patient's record [Patient] : Prior to the start of the procedure a time out was taken and the identity of the patient was confirmed via name and date of birth with the patient. The correct site and the procedure to be performed were confirmed. The correct side was confirmed if applicable. The availability of the correct equipment was verified [Therapeutic] : therapeutic [#1 Site: ______] : #1 site identified in the [unfilled] [Ethyl Chloride] : ethyl chloride [Betadine] : betadine solution [Alcohol] : alcohol [22 gauge 1.5 inch] : A 22 gauge 1.5 inch needle was used [___ml 1% Lidocaine] : [unfilled] ml of 1% lidocaine [Tolerated Well] : the patient tolerated the procedure well [No Complications] : there were no complications [de-identified] : Kenalog 40 mg

## 2023-02-15 NOTE — ASSESSMENT
[FreeTextEntry1] : 67-year-old female, here for the first follow up with has OA hands; has DDD; with left shoulder pain with bursitis today.\par -No synovitis or effusion on exam noted today and advised to monitor.\par -reviewed labs 10/13/22 with CBC w/ mildly low hb=11.3 (denies any blood in stools and told to monitor with PCP); CMP w/ creat=1.05 w/ GFR=59 (told to stay hydrated and avoid NSAIDs); normal ESR/CRP; HLA-B27 negative; RF/CCP negative; RO/LA negative with all other serologies normal at this time  \par -dry mouth: discussed biotene mouthwash or toothpaste PRN; RO/LA negative \par \par Lt shoulder pain/bursitis: offered Kenalog 40 mg injection today w/ r/b/s discussed and patient agreeable.\par -Has good ROM in b/l shoulders, no pelvic/girdle stiffness and able to stand up without using her hands, no temporal pain/unremitting headaches, no vision changes, no jaw pain.\par \par Cervicalgia: intermittent; not much pain today\par -reviewed MRI c-spine 1/10/22 w/ multilevel cervical spondylosis \par \par LBP: intermittent; HLA-B27 Negative; ESR/CRP normal\par -reviewed SI xray 10/13/22 Normal\par  -MRI L-spine 5/14/22 w/ multilevel lumbar spondylosis w/ dextroscoliosis \par -Motrin PRN w/ food needed sparingly helps\par -defers PT stating it did not help much & knows to do exercises learned at home \par -doing epidural injections w/ pain management, Dr. Sterling\par -reports was given gabapentin 300 mg TID that she BID\par \par Bone health: reviewed Dexa 10/13/22 Normal\par \par -educated on symptoms to monitor for in detail and alert us if any concerns.\par -knows to stay up to date on health maintenance w/ PCP\par -f/u PRN please \par

## 2023-02-21 ENCOUNTER — APPOINTMENT (OUTPATIENT)
Dept: PAIN MANAGEMENT | Facility: CLINIC | Age: 68
End: 2023-02-21
Payer: MEDICARE

## 2023-02-21 PROCEDURE — J3490M: CUSTOM

## 2023-02-21 PROCEDURE — 62323 NJX INTERLAMINAR LMBR/SAC: CPT

## 2023-02-21 NOTE — PROCEDURE
[FreeTextEntry3] : Date of Service: 02/21/2023 \par \par Account: [id number]\par \par Patient: VIKTOR CHUN \par \par YOB: 1955\par \par Age: 67 year\par \par \par Surgeon:      Glenna Sterling M.D.\par \par Pre-Operative Diagnosis:                              Lumbar Radiculitis (54.17)\par \par Post Operative Diagnosis:                            Same\par \par Procedure:                                                       Caudal epidural steroid injection  under fluoroscopic guidance\par \par Anesthesia:                                                      MAC\par \par \par This procedure was carried out using fluoroscopic guidance.  The risks and benefits of the procedure were discussed extensively with the patient.  Risks included infection, bleeding, epidural hematoma, nerve damage, and post-dural puncture headache.  The consent of the patient was obtained and the following procedure was performed.\par \par The patient was placed in the prone position using a pillow under the abdomen to reduce the lumbar lordosis.  The lumbo-sacral area was prepped and draped in a sterile fashion.  Using fluoroscopic guidance the sacrum was visualized using a lateral view.   Using sterile technique the superficial skin was anesthetized with 1.5% Lidocaine without epinephrine.  A 22 inch spinal needle was advanced under fluoroscopy using [id number] technique until the sacral-coccygeal ligament was engaged and penetrated.  After confirmation of needle placement in the epidural space, the needle was advanced to approximately the S3 level.  \par \par After negative aspiration for heme and CSF, 3 cc of Omnipaque confirmed good lumbar epiduragram.  An AP view was used to confirm dye spread.  An injectate of 10 cc of preservative free normal saline, 0.25% marcaine and 1% Lidocaine plus 80 mg of Kenalog was then injected into the epidural space. The needle was subsequently removed and pressure was applied.\par \par The patient tolerated the procedure well without any complications.  Vitals signs remained within normal limits throughout the procedure.  The patient was instructed to apply ice to the area for approximately 20 minutes 3-4  times for the next 24 hours.  The patient was also instructed to contact me immediately if there were any problems.\par \par Glenna Sterling M.D.

## 2023-03-07 ENCOUNTER — APPOINTMENT (OUTPATIENT)
Dept: PAIN MANAGEMENT | Facility: CLINIC | Age: 68
End: 2023-03-07
Payer: MEDICARE

## 2023-03-07 VITALS — BODY MASS INDEX: 25.83 KG/M2 | WEIGHT: 155 LBS | HEIGHT: 65 IN

## 2023-03-07 PROCEDURE — 99214 OFFICE O/P EST MOD 30 MIN: CPT

## 2023-03-07 NOTE — ASSESSMENT
[FreeTextEntry1] : After discussing various treatment options with the patient including but not limited to oral medications, physical therapy, exercise, modalities as well as interventional spinal injections, we have decided with the following plan:\par \par 1) Intervention Injection Therapy:\par I personally reviewed the MRI/CT scan images and agree with the radiologist's report. The radiological findings were discussed with the patient.\par The risks, benefits, contents and alternatives to injection were explained in full to the patient. Risks outlined include but are not limited to infection,sepsis, bleeding, post-dural puncture headache, nerve damage, temporary increase in pain, syncopal episode, failure to resolve symptoms, allergic reaction, symptom recurrence, and elevation of blood sugar in diabetics. Cortisone may cause immunosuppression. Patient understands the risks. All questions were answered. After discussion of options, patient requested an injection. Information regarding the injection was given to the patient. Which medications to stop prior to the injection was explained to the patient as well.\par \par Follow up in 1-2 weeks post injection for re-evaluation. \par Continue Home exercises, stretching, activity modification, physical therapy, and conservative care.\par \par Patient is presenting with acute/sub-acute radicular pain with impairment in ADLs and functionality.  The pain has not responded to  conservative care including nsaid therapy and/or physical therapy.  There is no bleeding tendency, unstable medical condition, or systemic infection. The purpose of the spinal injections is to facilitate active therapy by providing short term relief through reduction of pain and inflammation. \par \par Injections, by themselves, are not likely to provide long-term relief. Rather, active rehabilitation with modified work achieves long-term relief by increasing active ROM, strength and stability. \par \par Caudal JUSTIN--will call \par \par 2) continue gabapentin. will increase night time dosage to 600mg.

## 2023-03-07 NOTE — PHYSICAL EXAM
[] : no thoracic paraspinal spasm [TWNoteComboBox7] : forward flexion 75 degrees [de-identified] : extension 10 degrees

## 2023-03-07 NOTE — HISTORY OF PRESENT ILLNESS
[Lower back] : lower back [1] : 2 [Dull/Aching] : dull/aching [Tingling] : tingling [Constant] : constant [Rest] : rest [Meds] : meds [Injection therapy] : injection therapy [Nothing helps with pain getting better] : Nothing helps with pain getting better [Standing] : standing [5] : 5 [FreeTextEntry1] : 3/7/23- fu for caudal on 2/21/23 with 75% relief. she is now able to go up and down the stairs much easier. \par \par 02/1/23: follow up today . pain across the lower the back and down the posterior right leg. +n/t in the leg. Still taking. gabapentin BID. \par \par 11/2/22: f/u for right L4-5, L5-S1 TFESI on 10/14.   Had 80% relief from pain down right leg.  Has  tingling in left leg.  Increase gabapentin to TID.  \par \par 09/14/2022: follow up today after right L4/5 L5/S1 TFESI on 6/23/22. had good relief for about a month. (75%  or so) pain in the lower back with radiation  + n/t pain worse with standing. \par \par 07/06/2022: follow up today from 6/23 right l4-L5, L5-S1. Had 80% relief. Has a boot on left foot after fracture of left foot.\par \par 06/08/2022 : Patient presents for initial evaluation. She complains of pain in the lower back with radiation down the right lateral leg to the foot. Pain got worse over the last 3 months. PT was not helping. +n/t , No weakness. She takes Advil PRN with limited relief. She saw Dr. Ojeda. she was diagnosed with a foot fracture and currently in a walking boot. \par \par Subjective Weakness:No\par Numbness/Tingling: Yes\par Bladder/Bowel dysfunction: Yes/No\par Physical Therapy: Yes\par \par \par Attempted modalities for current pain complaint:\par See above:\par Medications:No\par \par Injections: right L4/5 L5/S1 (6/23/22, 10/14/22)\par caudal 2/21/23\par \par Previous Spine Surgery: N/A\par \par Imaging:\par MRI Lumbar Spine (5/7/22): \par IMPRESSION:\par Multilevel lumbar spondylosis with dextroscoliosis, exaggerated lumbar lordosis, right lateral translation at L1-2 and left lateral translation at L4-5, and grade 1 anterolisthesis at L5-S1 with bilateral spondylolysis.\par \par Multilevel moderate to severe narrowing of the lateral recesses and neural foramina. Severe central stenosis at L5-S1, moderate central stenosis at L3-4 and L4-5, and milder canal narrowing in the upper lumbar spine.\par \par  [] : no [FreeTextEntry6] : tender  [de-identified] : Getting up from a sitting position [de-identified] : 05/07/2022 Amilcar granados [place]

## 2023-03-14 ENCOUNTER — APPOINTMENT (OUTPATIENT)
Dept: ULTRASOUND IMAGING | Facility: HOSPITAL | Age: 68
End: 2023-03-14

## 2023-03-14 ENCOUNTER — OUTPATIENT (OUTPATIENT)
Dept: OUTPATIENT SERVICES | Facility: HOSPITAL | Age: 68
LOS: 1 days | End: 2023-03-14
Payer: MEDICARE

## 2023-03-14 ENCOUNTER — APPOINTMENT (OUTPATIENT)
Dept: MAMMOGRAPHY | Facility: HOSPITAL | Age: 68
End: 2023-03-14
Payer: MEDICARE

## 2023-03-14 DIAGNOSIS — Z12.31 ENCOUNTER FOR SCREENING MAMMOGRAM FOR MALIGNANT NEOPLASM OF BREAST: ICD-10-CM

## 2023-03-14 PROCEDURE — 76641 ULTRASOUND BREAST COMPLETE: CPT

## 2023-03-14 PROCEDURE — 77067 SCR MAMMO BI INCL CAD: CPT

## 2023-03-14 PROCEDURE — 77063 BREAST TOMOSYNTHESIS BI: CPT | Mod: 26

## 2023-03-14 PROCEDURE — 76641 ULTRASOUND BREAST COMPLETE: CPT | Mod: 26,50

## 2023-03-14 PROCEDURE — 77067 SCR MAMMO BI INCL CAD: CPT | Mod: 26

## 2023-03-14 PROCEDURE — 77063 BREAST TOMOSYNTHESIS BI: CPT

## 2023-04-11 ENCOUNTER — APPOINTMENT (OUTPATIENT)
Dept: ORTHOPEDIC SURGERY | Facility: CLINIC | Age: 68
End: 2023-04-11
Payer: MEDICARE

## 2023-04-11 VITALS — BODY MASS INDEX: 25.83 KG/M2 | HEIGHT: 65 IN | WEIGHT: 155 LBS

## 2023-04-11 DIAGNOSIS — M20.41 OTHER HAMMER TOE(S) (ACQUIRED), RIGHT FOOT: ICD-10-CM

## 2023-04-11 PROCEDURE — 73630 X-RAY EXAM OF FOOT: CPT | Mod: RT

## 2023-04-11 PROCEDURE — 99214 OFFICE O/P EST MOD 30 MIN: CPT

## 2023-04-11 NOTE — PHYSICAL EXAM
[Right] : right foot and ankle [2nd] : 2nd [NL (40)] : plantar flexion 40 degrees [NL 30)] : inversion 30 degrees [NL (20)] : eversion 20 degrees [5___] : eversion 5[unfilled]/5 [2+] : dorsalis pedis pulse: 2+ [] : patient ambulates without assistive device

## 2023-04-11 NOTE — HISTORY OF PRESENT ILLNESS
[10] : 10 [5] : 5 [Dull/Aching] : dull/aching [Sharp] : sharp [de-identified] : 04/11/2023:  long term right foot/2nd toe  pain associated with "hammertoe".  No intervention to date.  Painful at night.  No dm/no tob [] : no [FreeTextEntry1] : right foot

## 2023-04-11 NOTE — ASSESSMENT
[FreeTextEntry1] : wbat\par otc brace\par discussed correction and what that would entail. would like to hold off for now\par shoe modification\par f/up prn

## 2023-05-18 ENCOUNTER — APPOINTMENT (OUTPATIENT)
Dept: RHEUMATOLOGY | Facility: CLINIC | Age: 68
End: 2023-05-18
Payer: MEDICARE

## 2023-05-18 VITALS
SYSTOLIC BLOOD PRESSURE: 118 MMHG | OXYGEN SATURATION: 97 % | DIASTOLIC BLOOD PRESSURE: 70 MMHG | WEIGHT: 162 LBS | HEART RATE: 87 BPM | BODY MASS INDEX: 26.96 KG/M2 | TEMPERATURE: 97.6 F

## 2023-05-18 PROCEDURE — 99214 OFFICE O/P EST MOD 30 MIN: CPT

## 2023-05-18 RX ORDER — GABAPENTIN 300 MG/1
300 CAPSULE ORAL 3 TIMES DAILY
Qty: 90 | Refills: 1 | Status: DISCONTINUED | COMMUNITY
Start: 2022-09-14 | End: 2023-05-18

## 2023-05-18 NOTE — PHYSICAL EXAM
[General Appearance - Alert] : alert [General Appearance - In No Acute Distress] : in no acute distress [Sclera] : the sclera and conjunctiva were normal [Extraocular Movements] : extraocular movements were intact [Outer Ear] : the ears and nose were normal in appearance [Neck Appearance] : the appearance of the neck was normal [Respiration, Rhythm And Depth] : normal respiratory rhythm and effort [Heart Rate And Rhythm] : heart rate was normal and rhythm regular [Heart Sounds] : normal S1 and S2 [Abdomen Soft] : soft [Abdomen Tenderness] : non-tender [Cervical Lymph Nodes Enlarged Anterior Bilaterally] : anterior cervical [Supraclavicular Lymph Nodes Enlarged Bilaterally] : supraclavicular [No CVA Tenderness] : no ~M costovertebral angle tenderness [Motor Tone] : muscle strength and tone were normal [] : no rash [No Focal Deficits] : no focal deficits [Impaired Insight] : insight and judgment were intact [Mood] : the mood was normal [FreeTextEntry1] : tenderness in Rt hand 2-5 MCP and 2-5 PIPs; Good ROM in b/l shoulders, no pelvic/girdle stiffness and able to stand up without using her hands

## 2023-05-18 NOTE — REASON FOR VISIT
[Follow-Up: _____] : a [unfilled] follow-up visit [FreeTextEntry1] : pain Rt>lt hand; OA; DDD; review labs/xrays

## 2023-05-18 NOTE — HISTORY OF PRESENT ILLNESS
[FreeTextEntry1] : 67-year-old female, here for follow up reports of RA in mother; reports of daily pain in the Rt>Lt hand in 2-5 MCPs and 2-5 PIPs for over a month.\par States morning stiffness is about an hour at least.\par States she notes intermittent neck pain though not much today.\par States she notices lower back pain worse with standing; and unsure what makes it better. Denies any loss of bowel incontinence or saddle anesthesias.\par States PT does not help much.\par States she sees pain management Dr. Enriquez and is getting epidural injections.\par States she takes gabapentin to help the pain. \par Denies any fever/chills, no rashes, no ulcers, no dry eyes, no dry mouth; no raynaud's, no infectious diarrhea or  symptoms at this time.\par Has good ROM in b/l shoulders, no pelvic/girdle stiffness and able to stand up without using her hands, no temporal pain/unremitting headaches, no vision changes, no jaw pain.\par \par States she has a history of a left foot second metatarsal fracture after mechanical fall. \par She has Dexa 10/13/22 Normal.\par

## 2023-05-18 NOTE — ASSESSMENT
[FreeTextEntry1] : 67-year-old female, here for follow up reports of RA in mother; has OA hands; has DDD; reports of daily pain in the Rt>Lt hand in 2-5 MCPs and 2-5 PIPs for over a month.\par -Referred for Ultrasound of b/l hands to evaluate for any subclinical synovitis\par -reviewed labs 10/13/22 with CBC w/ mildly low hb=11.3 (denies any blood in stools and told to monitor with PCP); CMP w/ creat=1.05 w/ GFR=59 (told to stay hydrated and avoid NSAIDs); normal ESR/CRP; HLA-B27 negative; RF/CCP negative; RO/LA negative with all other serologies normal at this time \par -dry mouth: discussed biotene mouthwash or toothpaste PRN; RO/LA negative \par \par Cervicalgia: intermittent; not much pain today\par -reviewed MRI c-spine 1/10/22 w/ multilevel cervical spondylosis \par \par LBP: intermittent; HLA-B27 Negative; ESR/CRP normal\par -reviewed SI xray 10/13/22 Normal\par -MRI L-spine 5/14/22 w/ multilevel lumbar spondylosis w/ dextroscoliosis \par -Motrin PRN w/ food needed sparingly helps\par -defers PT stating it did not help much & knows to do exercises learned at home \par -doing epidural injections w/ pain management, Dr. Sterling\par -reports she takes gabapentin 300 mg BID\par \par Bone health: Dexa 10/13/22 Normal\par \par -educated on symptoms to monitor for in detail and alert us if any concerns.\par -knows to stay up to date on health maintenance w/ PCP\par -f/u in 2 weeks w/ US hands please \par . \par

## 2023-05-31 ENCOUNTER — OUTPATIENT (OUTPATIENT)
Dept: OUTPATIENT SERVICES | Facility: HOSPITAL | Age: 68
LOS: 1 days | End: 2023-05-31
Payer: MEDICARE

## 2023-05-31 ENCOUNTER — APPOINTMENT (OUTPATIENT)
Dept: ULTRASOUND IMAGING | Facility: CLINIC | Age: 68
End: 2023-05-31
Payer: MEDICARE

## 2023-05-31 ENCOUNTER — RESULT REVIEW (OUTPATIENT)
Age: 68
End: 2023-05-31

## 2023-05-31 DIAGNOSIS — M19.049 PRIMARY OSTEOARTHRITIS, UNSPECIFIED HAND: ICD-10-CM

## 2023-05-31 PROCEDURE — 76882 US LMTD JT/FCL EVL NVASC XTR: CPT

## 2023-05-31 PROCEDURE — 76882 US LMTD JT/FCL EVL NVASC XTR: CPT | Mod: 26,LT

## 2023-06-05 ENCOUNTER — APPOINTMENT (OUTPATIENT)
Dept: RHEUMATOLOGY | Facility: CLINIC | Age: 68
End: 2023-06-05
Payer: MEDICARE

## 2023-06-05 VITALS
BODY MASS INDEX: 26.99 KG/M2 | SYSTOLIC BLOOD PRESSURE: 116 MMHG | WEIGHT: 162 LBS | HEIGHT: 65 IN | OXYGEN SATURATION: 97 % | HEART RATE: 88 BPM | DIASTOLIC BLOOD PRESSURE: 72 MMHG

## 2023-06-05 DIAGNOSIS — D64.9 ANEMIA, UNSPECIFIED: ICD-10-CM

## 2023-06-05 PROCEDURE — 20550 NJX 1 TENDON SHEATH/LIGAMENT: CPT

## 2023-06-05 PROCEDURE — 99214 OFFICE O/P EST MOD 30 MIN: CPT | Mod: 25

## 2023-06-05 RX ORDER — TRIAMCINOLONE ACETONIDE 40 MG/ML
40 SUSPENSION INTRA-ARTERIAL; INTRAMUSCULAR
Qty: 1 | Refills: 0 | Status: COMPLETED | OUTPATIENT
Start: 2023-06-05

## 2023-06-05 RX ADMIN — TRIAMCINOLONE ACETONIDE 0 MG/ML: 40 INJECTION, SUSPENSION INTRA-ARTICULAR; INTRAMUSCULAR at 00:00

## 2023-06-05 NOTE — ASSESSMENT
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 with small joint effusion Rt 4th MCP with pain on making a fist and writing with stiffness on & off all day in the hands concerning for Seronegative arthritis and favor RA.\par \par Seronegative arthritis: favor RA\par -today has tenderness Rt hand 2-5 MCPs with swelling and tenderness Rt hand 2-5 PIPs & Lt hand 2nd MCP tenderness\par -reviewed  US hand 5/31/23 stating 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 with small joint effusion Rt 4th MCP \par -reviewed labs 10/13/22 with CBC w/ mildly low hb=11.3 (denies any blood in stools and told to monitor with PCP); CMP w/ creat=1.05 w/ GFR=59 (told to stay hydrated and avoid NSAIDs); G6PD normal; normal ESR/CRP; HLA-B27 negative; RF/CCP negative; RO/LA negative with all other serologies normal at this time \par -discussed r/b/s of Sulfasalazine w/ no hx of allergy to it w/ prescription sent for sulfasalazine 500mg PO 1tab PO daily x 1week then if tolerating well increase to sulfasalazine 500mg 1tab PO BID\par -labs as below requested to monitor on it for follow up\par -discussed r/b/s of prednisone 10 mg w/ taper off w/ pt agreeable and prescription sent as below\par \par Rt medial epicondylitis: offered Kenalog 40 mg injection today w/ r/b/s discussed and patient agreeable.\par \par Cervicalgia: intermittent; not much pain today\par -reviewed MRI c-spine 1/10/22 w/ multilevel cervical spondylosis \par \par LBP: intermittent; HLA-B27 Negative\par -xray SI xray 10/13/22 Normal\par -MRI L-spine 5/14/22 w/ multilevel lumbar spondylosis w/ dextroscoliosis \par -Motrin PRN w/ food needed sparingly helps\par -defers PT stating it did not help much & knows to do exercises learned at home \par -doing epidural injections w/ pain management, Dr. Sterling\par -reports she takes gabapentin 300 mg BID\par \par Bone health: Dexa 10/13/22 Normal\par \par -educated on symptoms to monitor for in detail and alert us if any concerns.\par -knows to stay up to date on health maintenance w/ PCP\par -f/u in 6-8 weeks w/ labs on Sulfasalazine please or sooner as needed  \par . \par

## 2023-06-05 NOTE — HISTORY OF PRESENT ILLNESS
[FreeTextEntry1] : 67-year-old female, here for follow up reports of RA in mother; reports of daily pain in the Rt>Lt hand in Rt hand 2-5 MCPs with swelling and Rt and 2-5 PIPs and LT hand 2nd MCP for about > 3 months.\par States morning stiffness is on & off all day.\par States she notes pain in the hands w/ making a fist and when writing also.\par States she tries to rub her hands to make the pain better.\par States running warm water helps the stiffness a bit. \par States she has some Rt elbow pain on the medial aspect and would steroid injectcion to help for it.  \par States she notes intermittent neck pain though not much today.\par States she notices lower back pain worse with standing; and unsure what makes it better. Denies any loss of bowel incontinence or saddle anesthesias.\par States PT does not help much.\par States she sees pain management Dr. Enriquez and is getting epidural injections.\par States she takes gabapentin to help the pain. \par Denies any fever/chills, no rashes, no ulcers, no dry eyes, no dry mouth; no raynaud's, no infectious diarrhea or  symptoms at this time.\par Has good ROM in b/l shoulders, no pelvic/girdle stiffness and able to stand up without using her hands, no temporal pain/unremitting headaches, no vision changes, no jaw pain.\par \par States she has a history of a left foot second metatarsal fracture after mechanical fall. \par She has Dexa 10/13/22 Normal.\par

## 2023-06-05 NOTE — PHYSICAL EXAM
[General Appearance - Alert] : alert [General Appearance - In No Acute Distress] : in no acute distress [Sclera] : the sclera and conjunctiva were normal [Extraocular Movements] : extraocular movements were intact [Outer Ear] : the ears and nose were normal in appearance [Neck Appearance] : the appearance of the neck was normal [Respiration, Rhythm And Depth] : normal respiratory rhythm and effort [Heart Rate And Rhythm] : heart rate was normal and rhythm regular [Heart Sounds] : normal S1 and S2 [Abdomen Soft] : soft [Abdomen Tenderness] : non-tender [Cervical Lymph Nodes Enlarged Anterior Bilaterally] : anterior cervical [Supraclavicular Lymph Nodes Enlarged Bilaterally] : supraclavicular [No CVA Tenderness] : no ~M costovertebral angle tenderness [] : no rash [No Focal Deficits] : no focal deficits [Impaired Insight] : insight and judgment were intact [Mood] : the mood was normal [FreeTextEntry1] : tenderness with swelling Rt hand 2-5 MCPs and Rt hand 2-5 PIPs & Lt hand 2nd MCP tenderness; Rt medial epicondylitis tenderness

## 2023-06-05 NOTE — REASON FOR VISIT
[Follow-Up: _____] : a [unfilled] follow-up visit [FreeTextEntry1] : joint pain in the hands; review US hands; labs/med; Rt medial epicondylitis

## 2023-06-05 NOTE — PROCEDURE
[Today's Date:] : Date: [unfilled] [Benefits] : benefits [Alternatives] : alternatives [Consent Obtained] : written consent was obtained prior to the procedure and is detailed in the patient's record [Patient] : Prior to the start of the procedure a time out was taken and the identity of the patient was confirmed via name and date of birth with the patient. The correct site and the procedure to be performed were confirmed. The correct side was confirmed if applicable. The availability of the correct equipment was verified [Therapeutic] : therapeutic [#1 Site: ______] : #1 site identified in the [unfilled] [Ethyl Chloride] : ethyl chloride [Alcohol] : alcohol [25 gauge 5/8  inch] : A 25 gauge 5/8 inch needle was used [___ml 1% Lidocaine] : [unfilled] ml of 1% lidocaine [Tolerated Well] : the patient tolerated the procedure well [No Complications] : there were no complications [de-identified] : Kenalog 40 mg

## 2023-07-27 ENCOUNTER — APPOINTMENT (OUTPATIENT)
Dept: RHEUMATOLOGY | Facility: CLINIC | Age: 68
End: 2023-07-27
Payer: MEDICARE

## 2023-07-27 VITALS
SYSTOLIC BLOOD PRESSURE: 120 MMHG | WEIGHT: 160 LBS | HEART RATE: 77 BPM | HEIGHT: 65 IN | TEMPERATURE: 97.8 F | DIASTOLIC BLOOD PRESSURE: 77 MMHG | OXYGEN SATURATION: 97 % | BODY MASS INDEX: 26.66 KG/M2

## 2023-07-27 DIAGNOSIS — M25.512 PAIN IN LEFT SHOULDER: ICD-10-CM

## 2023-07-27 DIAGNOSIS — M75.50 BURSITIS OF UNSPECIFIED SHOULDER: ICD-10-CM

## 2023-07-27 DIAGNOSIS — M77.11 LATERAL EPICONDYLITIS, RIGHT ELBOW: ICD-10-CM

## 2023-07-27 PROCEDURE — 99214 OFFICE O/P EST MOD 30 MIN: CPT | Mod: 25

## 2023-07-27 PROCEDURE — 20551 NJX 1 TENDON ORIGIN/INSJ: CPT | Mod: 59

## 2023-07-27 PROCEDURE — 20610 DRAIN/INJ JOINT/BURSA W/O US: CPT | Mod: LT

## 2023-07-27 RX ORDER — TRIAMCINOLONE ACETONIDE 40 MG/ML
40 SUSPENSION INTRA-ARTERIAL; INTRAMUSCULAR
Qty: 1 | Refills: 0 | Status: COMPLETED | OUTPATIENT
Start: 2023-07-27

## 2023-07-27 RX ORDER — SULFASALAZINE 500 MG/1
500 TABLET ORAL
Qty: 60 | Refills: 1 | Status: DISCONTINUED | COMMUNITY
Start: 2023-06-05 | End: 2023-07-27

## 2023-07-27 RX ADMIN — TRIAMCINOLONE ACETONIDE 0 MG/ML: 40 INJECTION, SUSPENSION INTRA-ARTICULAR; INTRAMUSCULAR at 00:00

## 2023-07-27 NOTE — ASSESSMENT
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 with small joint effusion Rt 4th MCP with pain on making a fist and writing with stiffness on & off all day in the hands concerning for Seronegative arthritis and favor RA.\par \par Seronegative arthritis: favor RA\par -today has tenderness Rt hand 2-5 MCPs with swelling and tenderness Rt hand 2-5 PIPs; Lt shoulder tenderness w/ decent ROM\par -states prednisone helped relieve all the pain and would like refill until DMARD starts to work\par -she wants to stop sulfasalazine as states not strong enough and will start stronger DMARD\par -US hand 5/31/23 stating 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 with small joint effusion Rt 4th MCP \par -reviewed labs [facility] ESR normal; CRP normal; CBC ok; CMP ok; UA micro without blood on sulfasalazine; hepatitis panel negative; 10/13/22 with CBC w/ mildly low hb=11.3 (denies any blood in stools and told to monitor with PCP); CMP w/ creat=1.05 w/ GFR=59 (told to stay hydrated and avoid NSAIDs); G6PD normal; normal ESR/CRP; HLA-B27 negative; RF/CCP negative; RO/LA negative with all other serologies normal at this time \par -discussed r/b/s of MTX & pt agreeable and knows to avoid alcohol; knows to hold MTX if active infection \par -prescription sent for MTX 2.5 mg 4 tabs PO weekly as discussed\par -prescription sent for folic acid 1 mg daily as discussed \par -labs as below prior to f/u on MTX as discussed\par -discussed r/b/s of prednisone 10 mg w/ taper off w/ pt agreeable and prescription sent as below\par -off Sulfasalazine 1 gm as not helping much\par \par Rt lateral epicondylitis: offered Kenalog 40 mg injection today w/ r/b/s discussed and patient agreeable.\par -discussed using ace band right below elbow and to rest it.\par \par Lt shoulder pain/bursitis: offered Kenalog 40 mg injection today and patient agreeable.\par -Good ROM in b/l shoulders, no pelvic/girdle stiffness and able to stand up without using her hands, no temporal pain/unremitting headaches, no vision changes, no jaw pain.\par \par Cervicalgia: intermittent; not much pain today\par -reviewed MRI c-spine 1/10/22 w/ multilevel cervical spondylosis \par \par LBP: intermittent; HLA-B27 Negative\par -xray SI xray 10/13/22 Normal\par -MRI L-spine 5/14/22 w/ multilevel lumbar spondylosis w/ dextroscoliosis \par -Motrin PRN w/ food needed sparingly helps\par -defers PT stating it did not help much & knows to do exercises learned at home \par -doing epidural injections w/ pain management, Dr. Sterling\par -reports she takes gabapentin 300 mg BID\par \par Bone health: Dexa 10/13/22 Normal\par \par -educated on symptoms to monitor for in detail and alert us if any concerns.\par -knows to stay up to date on health maintenance w/ PCP\par -f/u in 4-5 weeks w/ labs on MTX please or sooner as needed \par . \par  \par

## 2023-07-27 NOTE — HISTORY OF PRESENT ILLNESS
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 \par Today she has pain with  swelling in Rt hand 2-5 MCPs and Rt hand 2-5 PIPs.\par States morning stiffness is on & off all day.\par States she notes pain in the hands w/ making a fist and when writing also.\par States she tries to rub her hands to make the pain better.\par States running warm water helps the stiffness a bit. \par States she has achy left shoulder pain w/ rotation and would like steroid injection for help.\par Has good ROM in b/l shoulders, no pelvic/girdle stiffness and able to stand up without using her hands, no temporal pain/unremitting headaches, no vision changes, no jaw pain.\par States she has some Rt elbow pain on the lateral aspect and would steroid injectcion to help for it. \par States she notes intermittent neck pain though not much today.\par States she notices lower back pain worse with standing; and unsure what makes it better. Denies any loss of bowel incontinence or saddle anesthesias.\par States PT does not help much.\par States she sees pain management Dr. Enriquez and is getting epidural injections.\par States she takes gabapentin to help the pain. \par Denies any fever/chills, no rashes, no ulcers, no dry eyes, no dry mouth; no raynaud's, no infectious diarrhea or  symptoms at this time.\par \par States she has a history of a left foot second metatarsal fracture after mechanical fall. \par She has Dexa 10/13/22 Normal.\par

## 2023-07-27 NOTE — REASON FOR VISIT
[Follow-Up: _____] : a [unfilled] follow-up visit [FreeTextEntry1] : Rt hand pain with swelling; review labs/med; Lt shoulder pain; Rt lateral epicondylitis. \par

## 2023-07-27 NOTE — PHYSICAL EXAM
[General Appearance - Alert] : alert [General Appearance - In No Acute Distress] : in no acute distress [Sclera] : the sclera and conjunctiva were normal [Extraocular Movements] : extraocular movements were intact [Outer Ear] : the ears and nose were normal in appearance [Neck Appearance] : the appearance of the neck was normal [Respiration, Rhythm And Depth] : normal respiratory rhythm and effort [Heart Rate And Rhythm] : heart rate was normal and rhythm regular [Heart Sounds] : normal S1 and S2 [Abdomen Soft] : soft [Abdomen Tenderness] : non-tender [Cervical Lymph Nodes Enlarged Anterior Bilaterally] : anterior cervical [Supraclavicular Lymph Nodes Enlarged Bilaterally] : supraclavicular [No CVA Tenderness] : no ~M costovertebral angle tenderness [Motor Tone] : muscle strength and tone were normal [] : no rash [No Focal Deficits] : no focal deficits [Impaired Insight] : insight and judgment were intact [Mood] : the mood was normal [FreeTextEntry1] :  tenderness Rt hand 2-5 MCPs with swelling; tenderness Rt hand 2-5 PIPs; Lt shoulder tenderness w/ rotation with decent ROM; Rt lateral epicondylitis tenderness; Rt shoulder no tenderness w/ good ROM; able to stand up w/o using her hands

## 2023-07-27 NOTE — PROCEDURE
[Today's Date:] : Date: [unfilled] [Risks] : risks [Benefits] : benefits [Alternatives] : alternatives [Consent Obtained] : written consent was obtained prior to the procedure and is detailed in the patient's record [Patient] : Prior to the start of the procedure a time out was taken and the identity of the patient was confirmed via name and date of birth with the patient. The correct site and the procedure to be performed were confirmed. The correct side was confirmed if applicable. The availability of the correct equipment was verified [Therapeutic] : therapeutic [#1 Site: ______] : #1 site identified in the [unfilled] [Ethyl Chloride] : ethyl chloride [Betadine] : betadine solution [Alcohol] : alcohol [___ml 1% Lidocaine] : [unfilled] ml of 1% lidocaine [Tolerated Well] : the patient tolerated the procedure well [No Complications] : there were no complications [#2 Site: ___] : # 2 site identified in the [unfilled] [FreeTextEntry7] : x2 [FreeTextEntry5] : x2 [de-identified] : 22 gague 1.5 inch for Lt shoulder; 25 gauge 5/8 inch for Rt lateral epicondylitis  [de-identified] : Kenalog 40 mg x 2

## 2023-09-02 ENCOUNTER — NON-APPOINTMENT (OUTPATIENT)
Age: 68
End: 2023-09-02

## 2023-09-06 ENCOUNTER — APPOINTMENT (OUTPATIENT)
Dept: RHEUMATOLOGY | Facility: CLINIC | Age: 68
End: 2023-09-06

## 2023-10-17 ENCOUNTER — APPOINTMENT (OUTPATIENT)
Dept: PAIN MANAGEMENT | Facility: CLINIC | Age: 68
End: 2023-10-17
Payer: MEDICARE

## 2023-10-17 VITALS — BODY MASS INDEX: 26.66 KG/M2 | HEIGHT: 65 IN | WEIGHT: 160 LBS

## 2023-10-17 DIAGNOSIS — M54.16 RADICULOPATHY, LUMBAR REGION: ICD-10-CM

## 2023-10-17 PROCEDURE — 99214 OFFICE O/P EST MOD 30 MIN: CPT

## 2023-12-07 ENCOUNTER — APPOINTMENT (OUTPATIENT)
Dept: RHEUMATOLOGY | Facility: CLINIC | Age: 68
End: 2023-12-07
Payer: MEDICARE

## 2023-12-07 VITALS
BODY MASS INDEX: 24.83 KG/M2 | WEIGHT: 149 LBS | TEMPERATURE: 97.6 F | SYSTOLIC BLOOD PRESSURE: 124 MMHG | HEIGHT: 65 IN | HEART RATE: 82 BPM | OXYGEN SATURATION: 97 % | DIASTOLIC BLOOD PRESSURE: 76 MMHG

## 2023-12-07 DIAGNOSIS — M47.812 SPONDYLOSIS W/OUT MYELOPATHY OR RADICULOPATHY, CERVICAL REGION: ICD-10-CM

## 2023-12-07 DIAGNOSIS — M19.041 PRIMARY OSTEOARTHRITIS, RIGHT HAND: ICD-10-CM

## 2023-12-07 DIAGNOSIS — M19.049 PRIMARY OSTEOARTHRITIS, UNSPECIFIED HAND: ICD-10-CM

## 2023-12-07 DIAGNOSIS — M77.01 MEDIAL EPICONDYLITIS, RIGHT ELBOW: ICD-10-CM

## 2023-12-07 DIAGNOSIS — M47.816 SPONDYLOSIS W/OUT MYELOPATHY OR RADICULOPATHY, LUMBAR REGION: ICD-10-CM

## 2023-12-07 DIAGNOSIS — M19.042 PRIMARY OSTEOARTHRITIS, RIGHT HAND: ICD-10-CM

## 2023-12-07 DIAGNOSIS — M13.80 OTHER SPECIFIED ARTHRITIS, UNSPECIFIED SITE: ICD-10-CM

## 2023-12-07 PROCEDURE — 20550 NJX 1 TENDON SHEATH/LIGAMENT: CPT

## 2023-12-07 PROCEDURE — 99214 OFFICE O/P EST MOD 30 MIN: CPT | Mod: 25

## 2023-12-07 RX ORDER — METHOTREXATE 2.5 MG/1
2.5 TABLET ORAL
Qty: 20 | Refills: 0 | Status: ACTIVE | COMMUNITY
Start: 2023-07-27 | End: 1900-01-01

## 2023-12-07 RX ORDER — GABAPENTIN 300 MG/1
300 CAPSULE ORAL
Qty: 270 | Refills: 0 | Status: DISCONTINUED | COMMUNITY
Start: 2023-03-07 | End: 2023-12-07

## 2023-12-07 RX ORDER — TRIAMCINOLONE ACETONIDE 40 MG/ML
40 SUSPENSION INTRA-ARTERIAL; INTRAMUSCULAR
Qty: 1 | Refills: 0 | Status: COMPLETED | OUTPATIENT
Start: 2023-12-07

## 2023-12-07 RX ORDER — FOLIC ACID 1 MG/1
1 TABLET ORAL DAILY
Qty: 90 | Refills: 0 | Status: ACTIVE | COMMUNITY
Start: 2023-07-27 | End: 1900-01-01

## 2023-12-07 RX ORDER — PREDNISONE 5 MG/1
5 TABLET ORAL
Qty: 35 | Refills: 0 | Status: DISCONTINUED | COMMUNITY
Start: 2023-06-05 | End: 2023-12-07

## 2023-12-07 RX ADMIN — TRIAMCINOLONE ACETONIDE 40 MG/ML: 40 INJECTION, SUSPENSION INTRA-ARTICULAR; INTRAMUSCULAR at 00:00

## 2024-01-03 ENCOUNTER — APPOINTMENT (OUTPATIENT)
Dept: RHEUMATOLOGY | Facility: CLINIC | Age: 69
End: 2024-01-03

## 2024-02-08 NOTE — ED ADULT NURSE NOTE - HAVE YOU RECEIVED AT LEAST TWO PFIZER AND/OR MODERNA VACCINATIONS (IN ANY COMBINATION) AND/OR ONE JOHNSON & JOHNSON VACCINATION?
Yes Render In Bullet Format When Appropriate: No Detail Level: Detailed Render Post-Care Instructions In Note?: yes Duration Of Freeze Thaw-Cycle (Seconds): 2 Total Number Of Aks Treated: 3 Aperture Size (Optional): B Number Of Freeze-Thaw Cycles: 1 freeze-thaw cycle Post-Care Instructions: I reviewed with the patient in detail post-care instructions. Patient is to wear sunprotection, and avoid picking at any of the treated lesions. Pt may apply Vaseline to crusted or scabbing areas. Consent: The patient's consent was obtained including but not limited to risks of crusting, scabbing, blistering, scarring, darker or lighter pigmentary change, recurrence, incomplete removal and infection.

## 2024-03-19 ENCOUNTER — APPOINTMENT (OUTPATIENT)
Dept: PAIN MANAGEMENT | Facility: CLINIC | Age: 69
End: 2024-03-19
Payer: MEDICARE

## 2024-03-19 VITALS — BODY MASS INDEX: 22.49 KG/M2 | WEIGHT: 135 LBS | HEIGHT: 65 IN

## 2024-03-19 DIAGNOSIS — M54.50 LOW BACK PAIN, UNSPECIFIED: ICD-10-CM

## 2024-03-19 PROCEDURE — 99213 OFFICE O/P EST LOW 20 MIN: CPT

## 2024-03-19 RX ORDER — GABAPENTIN 300 MG/1
300 CAPSULE ORAL 3 TIMES DAILY
Qty: 90 | Refills: 2 | Status: ACTIVE | COMMUNITY
Start: 1900-01-01 | End: 1900-01-01

## 2024-03-19 NOTE — HISTORY OF PRESENT ILLNESS
[Lower back] : lower back [Constant] : constant [Sleep] : sleep [Rest] : rest [Meds] : meds [Injection therapy] : injection therapy [Nothing helps with pain getting better] : Nothing helps with pain getting better [Standing] : standing [4] : 4 [0] : 0 [Burning] : burning [FreeTextEntry1] : 03/19/2024 Follow up today.  Does well on gabapentin.    10/17/2023: Med refill.  Doing well on gabapentin TID.  3/7/23- fu for caudal on 2/21/23 with 75% relief. she is now able to go up and down the stairs much easier.   02/1/23: follow up today . pain across the lower the back and down the posterior right leg. +n/t in the leg. Still taking. gabapentin BID.   11/2/22: f/u for right L4-5, L5-S1 TFESI on 10/14.   Had 80% relief from pain down right leg.  Has  tingling in left leg.  Increase gabapentin to TID.    09/14/2022: follow up today after right L4/5 L5/S1 TFESI on 6/23/22. had good relief for about a month. (75%  or so) pain in the lower back with radiation  + n/t pain worse with standing.   07/06/2022: follow up today from 6/23 right l4-L5, L5-S1. Had 80% relief. Has a boot on left foot after fracture of left foot.  06/08/2022 : Patient presents for initial evaluation. She complains of pain in the lower back with radiation down the right lateral leg to the foot. Pain got worse over the last 3 months. PT was not helping. +n/t , No weakness. She takes Advil PRN with limited relief. She saw Dr. Ojeda. she was diagnosed with a foot fracture and currently in a walking boot.   Subjective Weakness:No Numbness/Tingling: Yes Bladder/Bowel dysfunction: Yes/No Physical Therapy: Yes   Attempted modalities for current pain complaint: See above: Medications:No  Injections: right L4/5 L5/S1 (6/23/22, 10/14/22) caudal 2/21/23  Previous Spine Surgery: N/A  Imaging: MRI Lumbar Spine (5/7/22):  IMPRESSION: Multilevel lumbar spondylosis with dextroscoliosis, exaggerated lumbar lordosis, right lateral translation at L1-2 and left lateral translation at L4-5, and grade 1 anterolisthesis at L5-S1 with bilateral spondylolysis.  Multilevel moderate to severe narrowing of the lateral recesses and neural foramina. Severe central stenosis at L5-S1, moderate central stenosis at L3-4 and L4-5, and milder canal narrowing in the upper lumbar spine.   [] : no [FreeTextEntry9] : gabapentin  [de-identified] : Getting up from a sitting position [de-identified] : 05/07/2022 Amilcar granados [place]

## 2024-03-19 NOTE — PHYSICAL EXAM
[] : no thoracic paraspinal spasm [TWNoteComboBox7] : forward flexion 75 degrees [de-identified] : extension 10 degrees

## 2024-03-19 NOTE — DATA REVIEWED
[Lumbar Spine] : lumbar spine [MRI] : MRI [I independently reviewed and interpreted images and report] : I independently reviewed and interpreted images and report [Report was reviewed and noted in the chart] : The report was reviewed and noted in the chart [I reviewed the films/CD and agree] : I reviewed the films/CD and agree

## 2024-04-23 NOTE — ED ADULT NURSE NOTE - ISAR MEDICATION
Yes Render Note In Bullet Format When Appropriate: No Post-Care Instructions: I reviewed with the patient in detail post-care instructions. Patient is to wear sunprotection, and avoid picking at any of the treated lesions. Pt may apply Vaseline to crusted or scabbing areas. Show Applicator Variable?: Yes Detail Level: Detailed Number Of Freeze-Thaw Cycles: 1 freeze-thaw cycle Consent: The patient's consent was obtained including but not limited to risks of crusting, scabbing, blistering, scarring, darker or lighter pigmentary change, recurrence, incomplete removal and infection. Duration Of Freeze Thaw-Cycle (Seconds): 0

## 2024-06-18 ENCOUNTER — APPOINTMENT (OUTPATIENT)
Dept: PAIN MANAGEMENT | Facility: CLINIC | Age: 69
End: 2024-06-18

## 2024-07-16 ENCOUNTER — APPOINTMENT (OUTPATIENT)
Dept: PAIN MANAGEMENT | Facility: CLINIC | Age: 69
End: 2024-07-16
Payer: MEDICARE

## 2024-07-16 VITALS — HEIGHT: 64 IN | WEIGHT: 128 LBS | BODY MASS INDEX: 21.85 KG/M2

## 2024-07-16 DIAGNOSIS — M54.50 LOW BACK PAIN, UNSPECIFIED: ICD-10-CM

## 2024-07-16 PROCEDURE — 99214 OFFICE O/P EST MOD 30 MIN: CPT

## 2024-07-18 ENCOUNTER — NON-APPOINTMENT (OUTPATIENT)
Age: 69
End: 2024-07-18

## 2024-07-24 ENCOUNTER — NON-APPOINTMENT (OUTPATIENT)
Age: 69
End: 2024-07-24

## 2024-07-25 ENCOUNTER — APPOINTMENT (OUTPATIENT)
Dept: ULTRASOUND IMAGING | Facility: HOSPITAL | Age: 69
End: 2024-07-25
Payer: MEDICARE

## 2024-07-25 ENCOUNTER — APPOINTMENT (OUTPATIENT)
Dept: MAMMOGRAPHY | Facility: HOSPITAL | Age: 69
End: 2024-07-25
Payer: MEDICARE

## 2024-07-25 ENCOUNTER — APPOINTMENT (OUTPATIENT)
Dept: ORTHOPEDIC SURGERY | Facility: CLINIC | Age: 69
End: 2024-07-25
Payer: MEDICARE

## 2024-07-25 DIAGNOSIS — M25.521 PAIN IN RIGHT ELBOW: ICD-10-CM

## 2024-07-25 PROCEDURE — 77063 BREAST TOMOSYNTHESIS BI: CPT | Mod: 26

## 2024-07-25 PROCEDURE — 77067 SCR MAMMO BI INCL CAD: CPT | Mod: 26

## 2024-07-25 PROCEDURE — 99204 OFFICE O/P NEW MOD 45 MIN: CPT

## 2024-07-25 PROCEDURE — 73080 X-RAY EXAM OF ELBOW: CPT | Mod: RT

## 2024-07-25 PROCEDURE — 76641 ULTRASOUND BREAST COMPLETE: CPT | Mod: 26,50

## 2024-07-29 ENCOUNTER — NON-APPOINTMENT (OUTPATIENT)
Age: 69
End: 2024-07-29

## 2024-07-29 DIAGNOSIS — E78.00 PURE HYPERCHOLESTEROLEMIA, UNSPECIFIED: ICD-10-CM

## 2024-07-29 DIAGNOSIS — L82.1 OTHER SEBORRHEIC KERATOSIS: ICD-10-CM

## 2024-07-29 DIAGNOSIS — M41.9 SCOLIOSIS, UNSPECIFIED: ICD-10-CM

## 2024-07-29 DIAGNOSIS — Z82.49 FAMILY HISTORY OF ISCHEMIC HEART DISEASE AND OTHER DISEASES OF THE CIRCULATORY SYSTEM: ICD-10-CM

## 2024-07-29 DIAGNOSIS — I10 ESSENTIAL (PRIMARY) HYPERTENSION: ICD-10-CM

## 2024-07-29 DIAGNOSIS — Z92.241 PERSONAL HISTORY OF SYSTEMIC STEROID THERAPY: ICD-10-CM

## 2024-07-29 DIAGNOSIS — L21.9 SEBORRHEIC DERMATITIS, UNSPECIFIED: ICD-10-CM

## 2024-07-29 PROBLEM — D69.2 PURPURA: Status: ACTIVE | Noted: 2024-07-29

## 2024-07-30 ENCOUNTER — RESULT REVIEW (OUTPATIENT)
Age: 69
End: 2024-07-30

## 2024-07-30 ENCOUNTER — APPOINTMENT (OUTPATIENT)
Dept: MRI IMAGING | Facility: HOSPITAL | Age: 69
End: 2024-07-30
Payer: MEDICARE

## 2024-07-30 ENCOUNTER — OUTPATIENT (OUTPATIENT)
Dept: OUTPATIENT SERVICES | Facility: HOSPITAL | Age: 69
LOS: 1 days | End: 2024-07-30
Payer: MEDICARE

## 2024-07-30 DIAGNOSIS — M54.50 LOW BACK PAIN, UNSPECIFIED: ICD-10-CM

## 2024-07-30 PROCEDURE — 72148 MRI LUMBAR SPINE W/O DYE: CPT | Mod: 26

## 2024-07-30 PROCEDURE — 72148 MRI LUMBAR SPINE W/O DYE: CPT

## 2024-08-10 ENCOUNTER — APPOINTMENT (OUTPATIENT)
Dept: DERMATOLOGY | Facility: CLINIC | Age: 69
End: 2024-08-10

## 2024-08-10 PROBLEM — I78.1 TELANGIECTASIA: Status: ACTIVE | Noted: 2024-08-10

## 2024-08-10 PROBLEM — D69.2 PURPURA: Status: ACTIVE | Noted: 2024-08-10

## 2024-08-10 PROBLEM — D69.2 PURPURA: Status: RESOLVED | Noted: 2024-07-29 | Resolved: 2024-08-10

## 2024-08-10 PROCEDURE — 99202 OFFICE O/P NEW SF 15 MIN: CPT

## 2024-08-10 NOTE — HISTORY OF PRESENT ILLNESS
[FreeTextEntry1] : f/u painful right elbow [de-identified] : Ortho evaluation negative for bony or cutaneous lesion on Xray.

## 2024-08-10 NOTE — ASSESSMENT
[FreeTextEntry1] : Alert, oriented, well, pleasant.  Telangiectasia mild over right elbow. 3 purpuric macules. 2 are new from yesterday. c/o pain with manipulation of skin but not with movement of joint. No palpable lesion or nodule. No lymphadenopathy. Denies h/o injury. No rubbing injury at work or home.  No treatment at present. Reevaluate if continues or spreads.

## 2024-10-17 ENCOUNTER — APPOINTMENT (OUTPATIENT)
Dept: ORTHOPEDIC SURGERY | Facility: CLINIC | Age: 69
End: 2024-10-17
Payer: COMMERCIAL

## 2024-10-17 VITALS — WEIGHT: 124 LBS | HEIGHT: 64 IN | BODY MASS INDEX: 21.17 KG/M2

## 2024-10-17 DIAGNOSIS — M75.21 BICIPITAL TENDINITIS, RIGHT SHOULDER: ICD-10-CM

## 2024-10-17 DIAGNOSIS — M25.511 PAIN IN RIGHT SHOULDER: ICD-10-CM

## 2024-10-17 DIAGNOSIS — M75.41 IMPINGEMENT SYNDROME OF RIGHT SHOULDER: ICD-10-CM

## 2024-10-17 PROCEDURE — 99214 OFFICE O/P EST MOD 30 MIN: CPT

## 2024-10-17 PROCEDURE — 73030 X-RAY EXAM OF SHOULDER: CPT | Mod: LT

## 2024-10-25 ENCOUNTER — NON-APPOINTMENT (OUTPATIENT)
Age: 69
End: 2024-10-25

## 2024-11-20 ENCOUNTER — NON-APPOINTMENT (OUTPATIENT)
Age: 69
End: 2024-11-20

## 2024-11-23 NOTE — HISTORY OF PRESENT ILLNESS
[Lower back] : lower back [Sharp] : sharp [Constant] : constant [Standing] : standing [10] : 10 [6] : 6 [Dull/Aching] : dull/aching [Tingling] : tingling [Sleep] : sleep [Rest] : rest [Nothing helps with pain getting better] : Nothing helps with pain getting better [FreeTextEntry1] : 09/14/2022: follow up today after right L4/5 L5/S1 TFESI on 6/23/22. had good relief for about a month. (75%  or so) pain in the lower back with radiation  + n/t pain worse with standing. \par \par 07/06/2022: follow up today from 6/23 right l4-L5, L5-S1. Had 80% relief. Has a boot on left foot after fracture of left foot.\par \par 06/08/2022 : Patient presents for initial evaluation. She complains of pain in the lower back with radiation down the right lateral leg to the foot. Pain got worse over the last 3 months. PT was not helping. +n/t , No weakness. She takes Advil PRN with limited relief. She saw Dr. Ojeda. she was diagnosed with a foot fracture and currently in a walking boot. \par \par Subjective Weakness:No\par Numbness/Tingling: Yes\par Bladder/Bowel dysfunction: Yes/No\par Physical Therapy: Yes\par \par \par Attempted modalities for current pain complaint:\par See above:\par Medications:No\par \par Injections: right L4/5 L5/S1 (6/23/22)\par \par Previous Spine Surgery: N/A\par \par Imaging:\par MRI Lumbar Spine (5/7/22): \par IMPRESSION:\par Multilevel lumbar spondylosis with dextroscoliosis, exaggerated lumbar lordosis, right lateral translation at L1-2 and left lateral translation at L4-5, and grade 1 anterolisthesis at L5-S1 with bilateral spondylolysis.\par \par Multilevel moderate to severe narrowing of the lateral recesses and neural foramina. Severe central stenosis at L5-S1, moderate central stenosis at L3-4 and L4-5, and milder canal narrowing in the upper lumbar spine.\par \par  [] : no [FreeTextEntry7] : Right leg to the foot  [de-identified] : Getting up from a sitting position [de-identified] : 05/07/2022 Amilcar granados [place] Yes

## 2024-12-16 ENCOUNTER — APPOINTMENT (OUTPATIENT)
Dept: PAIN MANAGEMENT | Facility: CLINIC | Age: 69
End: 2024-12-16
Payer: COMMERCIAL

## 2024-12-16 VITALS — HEIGHT: 64 IN | BODY MASS INDEX: 20.83 KG/M2 | WEIGHT: 122 LBS

## 2024-12-16 DIAGNOSIS — M54.16 RADICULOPATHY, LUMBAR REGION: ICD-10-CM

## 2024-12-16 PROCEDURE — 99214 OFFICE O/P EST MOD 30 MIN: CPT

## 2024-12-28 ENCOUNTER — NON-APPOINTMENT (OUTPATIENT)
Age: 69
End: 2024-12-28

## 2025-03-17 ENCOUNTER — APPOINTMENT (OUTPATIENT)
Dept: PAIN MANAGEMENT | Facility: CLINIC | Age: 70
End: 2025-03-17
Payer: COMMERCIAL

## 2025-03-17 VITALS — HEIGHT: 64 IN | BODY MASS INDEX: 20.83 KG/M2 | WEIGHT: 122 LBS

## 2025-03-17 DIAGNOSIS — M54.50 LOW BACK PAIN, UNSPECIFIED: ICD-10-CM

## 2025-03-17 PROCEDURE — 99213 OFFICE O/P EST LOW 20 MIN: CPT

## 2025-04-15 ENCOUNTER — APPOINTMENT (OUTPATIENT)
Dept: MRI IMAGING | Facility: HOSPITAL | Age: 70
End: 2025-04-15
Payer: MEDICARE

## 2025-04-15 ENCOUNTER — OUTPATIENT (OUTPATIENT)
Dept: OUTPATIENT SERVICES | Facility: HOSPITAL | Age: 70
LOS: 1 days | End: 2025-04-15
Payer: COMMERCIAL

## 2025-04-15 DIAGNOSIS — M75.21 BICIPITAL TENDINITIS, RIGHT SHOULDER: ICD-10-CM

## 2025-04-15 PROCEDURE — 73221 MRI JOINT UPR EXTREM W/O DYE: CPT

## 2025-04-15 PROCEDURE — 73221 MRI JOINT UPR EXTREM W/O DYE: CPT | Mod: 26,RT

## 2025-04-22 DIAGNOSIS — J98.4 OTHER DISORDERS OF LUNG: ICD-10-CM

## 2025-04-29 ENCOUNTER — NON-APPOINTMENT (OUTPATIENT)
Age: 70
End: 2025-04-29

## 2025-04-30 ENCOUNTER — OUTPATIENT (OUTPATIENT)
Dept: OUTPATIENT SERVICES | Facility: HOSPITAL | Age: 70
LOS: 1 days | End: 2025-04-30
Payer: COMMERCIAL

## 2025-04-30 ENCOUNTER — APPOINTMENT (OUTPATIENT)
Dept: CT IMAGING | Facility: CLINIC | Age: 70
End: 2025-04-30

## 2025-04-30 DIAGNOSIS — J98.4 OTHER DISORDERS OF LUNG: ICD-10-CM

## 2025-04-30 PROCEDURE — 71260 CT THORAX DX C+: CPT

## 2025-04-30 PROCEDURE — 71260 CT THORAX DX C+: CPT | Mod: 26

## 2025-05-01 ENCOUNTER — APPOINTMENT (OUTPATIENT)
Dept: ORTHOPEDIC SURGERY | Facility: CLINIC | Age: 70
End: 2025-05-01
Payer: COMMERCIAL

## 2025-05-01 VITALS — BODY MASS INDEX: 20.83 KG/M2 | HEIGHT: 64 IN | WEIGHT: 122 LBS

## 2025-05-01 DIAGNOSIS — M75.121 COMPLETE ROTATOR CUFF TEAR OR RUPTURE OF RIGHT SHOULDER, NOT SPECIFIED AS TRAUMATIC: ICD-10-CM

## 2025-05-01 PROCEDURE — 99214 OFFICE O/P EST MOD 30 MIN: CPT

## 2025-06-09 ENCOUNTER — APPOINTMENT (OUTPATIENT)
Dept: ORTHOPEDIC SURGERY | Facility: CLINIC | Age: 70
End: 2025-06-09
Payer: COMMERCIAL

## 2025-06-09 VITALS — BODY MASS INDEX: 20.83 KG/M2 | WEIGHT: 122 LBS | HEIGHT: 64 IN

## 2025-06-09 PROCEDURE — 99214 OFFICE O/P EST MOD 30 MIN: CPT

## 2025-06-14 ENCOUNTER — NON-APPOINTMENT (OUTPATIENT)
Age: 70
End: 2025-06-14

## 2025-06-16 ENCOUNTER — APPOINTMENT (OUTPATIENT)
Dept: PAIN MANAGEMENT | Facility: CLINIC | Age: 70
End: 2025-06-16
Payer: COMMERCIAL

## 2025-06-16 VITALS — BODY MASS INDEX: 21.17 KG/M2 | WEIGHT: 124 LBS | HEIGHT: 64 IN

## 2025-06-16 PROCEDURE — 99214 OFFICE O/P EST MOD 30 MIN: CPT

## 2025-07-01 ENCOUNTER — OUTPATIENT (OUTPATIENT)
Dept: OUTPATIENT SERVICES | Facility: HOSPITAL | Age: 70
LOS: 1 days | End: 2025-07-01
Payer: COMMERCIAL

## 2025-07-01 VITALS
RESPIRATION RATE: 16 BRPM | HEART RATE: 77 BPM | HEIGHT: 62 IN | DIASTOLIC BLOOD PRESSURE: 77 MMHG | WEIGHT: 123.9 LBS | OXYGEN SATURATION: 99 % | SYSTOLIC BLOOD PRESSURE: 137 MMHG | TEMPERATURE: 98 F

## 2025-07-01 DIAGNOSIS — Z98.890 OTHER SPECIFIED POSTPROCEDURAL STATES: Chronic | ICD-10-CM

## 2025-07-01 DIAGNOSIS — M75.121 COMPLETE ROTATOR CUFF TEAR OR RUPTURE OF RIGHT SHOULDER, NOT SPECIFIED AS TRAUMATIC: ICD-10-CM

## 2025-07-01 DIAGNOSIS — I10 ESSENTIAL (PRIMARY) HYPERTENSION: ICD-10-CM

## 2025-07-01 DIAGNOSIS — Z01.818 ENCOUNTER FOR OTHER PREPROCEDURAL EXAMINATION: ICD-10-CM

## 2025-07-01 DIAGNOSIS — Z98.891 HISTORY OF UTERINE SCAR FROM PREVIOUS SURGERY: Chronic | ICD-10-CM

## 2025-07-01 LAB
ANION GAP SERPL CALC-SCNC: 9 MMOL/L — SIGNIFICANT CHANGE UP (ref 5–17)
BUN SERPL-MCNC: 21 MG/DL — SIGNIFICANT CHANGE UP (ref 7–23)
CALCIUM SERPL-MCNC: 9.4 MG/DL — SIGNIFICANT CHANGE UP (ref 8.4–10.5)
CHLORIDE SERPL-SCNC: 106 MMOL/L — SIGNIFICANT CHANGE UP (ref 96–108)
CO2 SERPL-SCNC: 25 MMOL/L — SIGNIFICANT CHANGE UP (ref 22–31)
CREAT SERPL-MCNC: 1.17 MG/DL — SIGNIFICANT CHANGE UP (ref 0.5–1.3)
EGFR: 51 ML/MIN/1.73M2 — LOW
EGFR: 51 ML/MIN/1.73M2 — LOW
GLUCOSE SERPL-MCNC: 90 MG/DL — SIGNIFICANT CHANGE UP (ref 70–99)
HCT VFR BLD CALC: 32.4 % — LOW (ref 34.5–45)
HGB BLD-MCNC: 10.9 G/DL — LOW (ref 11.5–15.5)
MCHC RBC-ENTMCNC: 31.7 PG — SIGNIFICANT CHANGE UP (ref 27–34)
MCHC RBC-ENTMCNC: 33.6 G/DL — SIGNIFICANT CHANGE UP (ref 32–36)
MCV RBC AUTO: 94.2 FL — SIGNIFICANT CHANGE UP (ref 80–100)
NRBC BLD AUTO-RTO: 0 /100 WBCS — SIGNIFICANT CHANGE UP (ref 0–0)
PLATELET # BLD AUTO: 233 K/UL — SIGNIFICANT CHANGE UP (ref 150–400)
POTASSIUM SERPL-MCNC: 4 MMOL/L — SIGNIFICANT CHANGE UP (ref 3.5–5.3)
POTASSIUM SERPL-SCNC: 4 MMOL/L — SIGNIFICANT CHANGE UP (ref 3.5–5.3)
RBC # BLD: 3.44 M/UL — LOW (ref 3.8–5.2)
RBC # FLD: 12 % — SIGNIFICANT CHANGE UP (ref 10.3–14.5)
SODIUM SERPL-SCNC: 140 MMOL/L — SIGNIFICANT CHANGE UP (ref 135–145)
WBC # BLD: 5.37 K/UL — SIGNIFICANT CHANGE UP (ref 3.8–10.5)
WBC # FLD AUTO: 5.37 K/UL — SIGNIFICANT CHANGE UP (ref 3.8–10.5)

## 2025-07-01 PROCEDURE — 36415 COLL VENOUS BLD VENIPUNCTURE: CPT

## 2025-07-01 PROCEDURE — G0463: CPT

## 2025-07-01 PROCEDURE — 80048 BASIC METABOLIC PNL TOTAL CA: CPT

## 2025-07-01 PROCEDURE — 93005 ELECTROCARDIOGRAM TRACING: CPT

## 2025-07-01 PROCEDURE — 93010 ELECTROCARDIOGRAM REPORT: CPT | Mod: NC

## 2025-07-01 PROCEDURE — 85027 COMPLETE CBC AUTOMATED: CPT

## 2025-07-01 RX ORDER — GABAPENTIN 400 MG/1
1 CAPSULE ORAL
Refills: 0 | DISCHARGE

## 2025-07-01 NOTE — H&P PST ADULT - REASON FOR ADMISSION
Orthopedic Coordinator Post Op Call    Surgical follow-up call completed. Patient reports the following:  Nausea: No  Fever: No  Shortness of Breath: No  Wound concerns: No  Pain manageable with prescribed medications: Yes  Bowel movement since surgery: Yes    Reviewed medication side effects and incentive spirometry. Opportunity for questions and clarification provided. No concerns at this time.     ANGELLA QuezadaN, RN, [address] [place] [address]  Orthopedic  rotator cuff tear (right)

## 2025-07-01 NOTE — H&P PST ADULT - PROBLEM SELECTOR PLAN 1
Patient provided with pre-operative instructions via telephone and verbalized understanding.  Patient will be NPO on day of surgery. Patient will stop NSAIDs, aspirin, herbal supplements or vitamins 1 week prior to surgery.  Chlorhexidine instructions Patient provided with pre-operative instructions and verbalized understanding.  Patient  will be NPO on day of surgery. Patient will stop NSAIDs, aspirin, herbal supplements or vitamins 1 week prior to surgery.  Chlorhexidine wash provided with instructions, verbalized understanding.

## 2025-07-11 PROBLEM — E78.5 HYPERLIPIDEMIA, UNSPECIFIED: Chronic | Status: ACTIVE | Noted: 2025-07-01

## 2025-07-11 PROBLEM — I10 ESSENTIAL (PRIMARY) HYPERTENSION: Chronic | Status: ACTIVE | Noted: 2025-07-01

## 2025-07-14 ENCOUNTER — TRANSCRIPTION ENCOUNTER (OUTPATIENT)
Age: 70
End: 2025-07-14

## 2025-07-14 ENCOUNTER — OUTPATIENT (OUTPATIENT)
Dept: OUTPATIENT SERVICES | Facility: HOSPITAL | Age: 70
LOS: 1 days | End: 2025-07-14
Payer: COMMERCIAL

## 2025-07-14 ENCOUNTER — APPOINTMENT (OUTPATIENT)
Dept: ORTHOPEDIC SURGERY | Facility: HOSPITAL | Age: 70
End: 2025-07-14

## 2025-07-14 VITALS
RESPIRATION RATE: 16 BRPM | WEIGHT: 123.9 LBS | SYSTOLIC BLOOD PRESSURE: 131 MMHG | HEART RATE: 78 BPM | TEMPERATURE: 98 F | HEIGHT: 62 IN | DIASTOLIC BLOOD PRESSURE: 81 MMHG | OXYGEN SATURATION: 99 %

## 2025-07-14 VITALS
SYSTOLIC BLOOD PRESSURE: 126 MMHG | OXYGEN SATURATION: 98 % | TEMPERATURE: 97 F | DIASTOLIC BLOOD PRESSURE: 79 MMHG | HEART RATE: 85 BPM | RESPIRATION RATE: 16 BRPM

## 2025-07-14 DIAGNOSIS — Z98.890 OTHER SPECIFIED POSTPROCEDURAL STATES: Chronic | ICD-10-CM

## 2025-07-14 DIAGNOSIS — Z98.891 HISTORY OF UTERINE SCAR FROM PREVIOUS SURGERY: Chronic | ICD-10-CM

## 2025-07-14 DIAGNOSIS — M75.121 COMPLETE ROTATOR CUFF TEAR OR RUPTURE OF RIGHT SHOULDER, NOT SPECIFIED AS TRAUMATIC: ICD-10-CM

## 2025-07-14 PROCEDURE — C1713: CPT

## 2025-07-14 PROCEDURE — 29828 SHO ARTHRS SRG BICP TENODSIS: CPT | Mod: RT

## 2025-07-14 PROCEDURE — 29827 SHO ARTHRS SRG RT8TR CUF RPR: CPT | Mod: RT

## 2025-07-14 DEVICE — IMPLANTABLE DEVICE: Type: IMPLANTABLE DEVICE | Status: FUNCTIONAL

## 2025-07-14 RX ORDER — LABETALOL HYDROCHLORIDE 200 MG/1
0.5 TABLET, FILM COATED ORAL
Refills: 0 | DISCHARGE

## 2025-07-14 RX ORDER — SODIUM CHLORIDE 9 G/1000ML
1000 INJECTION, SOLUTION INTRAVENOUS
Refills: 0 | Status: DISCONTINUED | OUTPATIENT
Start: 2025-07-14 | End: 2025-07-14

## 2025-07-14 RX ORDER — ACETAMINOPHEN 500 MG/5ML
2 LIQUID (ML) ORAL
Qty: 0 | Refills: 0 | DISCHARGE

## 2025-07-14 RX ORDER — NAPROXEN SODIUM 275 MG
1 TABLET ORAL
Qty: 28 | Refills: 0
Start: 2025-07-14 | End: 2025-07-27

## 2025-07-14 RX ORDER — OXYCODONE HYDROCHLORIDE 30 MG/1
1 TABLET ORAL
Qty: 16 | Refills: 0
Start: 2025-07-14 | End: 2025-07-17

## 2025-07-14 RX ORDER — HYDROMORPHONE/SOD CHLOR,ISO/PF 2 MG/10 ML
0.5 SYRINGE (ML) INJECTION ONCE
Refills: 0 | Status: DISCONTINUED | OUTPATIENT
Start: 2025-07-14 | End: 2025-07-14

## 2025-07-14 RX ORDER — GABAPENTIN 400 MG/1
1 CAPSULE ORAL
Refills: 0 | DISCHARGE

## 2025-07-14 RX ORDER — ROSUVASTATIN CALCIUM 5 MG/1
1 TABLET, FILM COATED ORAL
Refills: 0 | DISCHARGE

## 2025-07-14 RX ORDER — ONDANSETRON HCL/PF 4 MG/2 ML
4 VIAL (ML) INJECTION ONCE
Refills: 0 | Status: DISCONTINUED | OUTPATIENT
Start: 2025-07-14 | End: 2025-07-14

## 2025-07-14 RX ADMIN — SODIUM CHLORIDE 50 MILLILITER(S): 9 INJECTION, SOLUTION INTRAVENOUS at 10:06

## 2025-07-14 NOTE — ASU DISCHARGE PLAN (ADULT/PEDIATRIC) - ASU DC SPECIAL INSTRUCTIONSFT
See written  post op instructions   Follow up with Dr Dumont in 7-10 call office for appt if you don't already have on e.  Take oxycodone as prescribed for severe pain , take tylenol in between to avoid narcotics   take naproxen daily as prescribed.   ice pack to shoulder several times a day for comfort and swelling

## 2025-07-14 NOTE — ASU PREOP CHECKLIST - TYPE OF SOLUTION
EMERGENCY DEPARTMENT HISTORY AND PHYSICAL EXAM      Date: 12/21/2021  Patient Name: Carmen Reina    History of Presenting Illness     Chief Complaint   Patient presents with    Bleeding/Bruising       History Provided By: Patient and Patient's Son    HPI: Carmen Reina, 81 y.o. female with a past medical history significant DM, HTN, seizure, Afib on Eliquis, arthritis, stroke, CKD on dialysis presents to the ED with cc of bleeding from Port-A-Cath of right upper chest wall onset this morning after leaving a doctor's appointment. Patient is Monday Wednesday Friday dialysis, she had to have her fistula repaired so she is having dialysis through her Port-A-Cath at this time. Next dialysis is tomorrow morning, no missed treatments. Patient specifically denies chest pain, fever, vomiting, diarrhea, recent illness, dizziness, weakness. There are no other complaints, changes, or physical findings at this time. PCP: Raul Frorest MD    Current Outpatient Medications   Medication Sig Dispense Refill    calcitRIOL (ROCALTROL) 0.5 mcg capsule Take 1 Capsule by mouth daily. 30 Capsule 0    zinc oxide-white petrolatum 17-57 % topical paste Apply  to affected area as needed for Skin Irritation. Indications: skin irritation 113 g 0    ondansetron hcl (Zofran) 4 mg tablet Take 1 Tablet by mouth every eight (8) hours as needed for Nausea or Vomiting. 45 Tablet 0    Vimpat 200 mg tab tablet Take 1 Tablet by mouth two (2) times a day.  ondansetron (Zofran ODT) 4 mg disintegrating tablet 1 Tablet by SubLINGual route every eight (8) hours as needed for Nausea or Vomiting. 20 Tablet 0    atorvastatin (LIPITOR) 40 mg tablet Take 1 tablet by mouth once daily for 90 days 90 Tablet 3    FLUoxetine (PROzac) 10 mg capsule Take 1 Capsule by mouth daily for 270 days.  90 Capsule 2    loperamide (IMODIUM) 2 mg capsule TAKE 1 CAPSULE BY MOUTH SIX TIMES DAILY AS NEEDED FOR 30 DAYS 180 Capsule 0    Euthyrox 150 mcg tablet Take 1 tablet by mouth once daily 90 Tablet 0    levETIRAcetam (KEPPRA XR) 500 mg ER tablet TAKE 2 TABLETS BY MOUTH TWICE DAILY FOR 90 DAYS      sevelamer carbonate (RENVELA) 800 mg tab tab          Past History     Past Medical History:  Past Medical History:   Diagnosis Date    Anxiety disorder     Arthritis     Atherosclerosis of native arteries of the extremities with ulceration (HCC) 8/31/2020    Atrial fibrillation (HCC)     Cardiac pacemaker in situ     Cellulitis and abscess of trunk 8/31/2020    Depression, postpartum     [facility] [address]     Heart disease     Heartburn     Hx of long term use of blood thinners     Hypercholesterolemia     Hypertension     Hypothyroidism     Migraines     Peripheral venous insufficiency 8/31/2020    Seizures ([facility] [address]     Sleep disorder     Stroke ([facility] [address]     2019    Varicose veins of lower extremity with inflammation 8/31/2020       Past Surgical History:  Past Surgical History:   Procedure Laterality Date    HX BACK SURGERY      HX OTHER SURGICAL      leg surgery     HX OTHER SURGICAL      pacemaker monitor     IR INSERT TUNL CVC W/O PORT OVER 5 YR  12/10/2021       Family History:  Family History   Problem Relation Age of Onset    Heart Disease Mother     Heart Disease Father     Diabetes Sister     Diabetes Brother        Social History:  Social History     Tobacco Use    Smoking status: Never Smoker    Smokeless tobacco: Never Used   Vaping Use    Vaping Use: Never used   Substance Use Topics    Alcohol use: Never    Drug use: Never       Allergies:  No Known Allergies      Review of Systems   Review of Systems   Constitutional: Negative for activity change, chills and fever. HENT: Negative for congestion, ear pain, rhinorrhea, sneezing and sore throat. Eyes: Negative for pain and visual disturbance. Respiratory: Negative for cough and shortness of breath. Cardiovascular: Negative for chest pain. Gastrointestinal: Negative for abdominal pain, diarrhea, nausea and vomiting. Genitourinary: Negative for dysuria and hematuria. Musculoskeletal: Negative for gait problem. Skin: Negative for rash. Bleeding Port-A-Cath   Neurological: Negative for speech difficulty, weakness and headaches. Psychiatric/Behavioral: The patient is not nervous/anxious. All other systems reviewed and are negative. Physical Exam   Physical Exam  Vitals and nursing note reviewed. Constitutional:       General: She is not in acute distress. Appearance: Normal appearance. She is not ill-appearing or toxic-appearing. HENT:      Head: Normocephalic and atraumatic. Nose: Nose normal.      Mouth/Throat:      Mouth: Mucous membranes are moist.   Eyes:      Extraocular Movements: Extraocular movements intact. Conjunctiva/sclera: Conjunctivae normal.      Pupils: Pupils are equal, round, and reactive to light. Cardiovascular:      Rate and Rhythm: Normal rate. Pulses: Normal pulses. Heart sounds: Normal heart sounds. Pulmonary:      Effort: Pulmonary effort is normal. No respiratory distress. Breath sounds: Normal breath sounds. Chest:      Comments: Port-A-Cath right upper chest wall (blood clot noted to tubing, no active bleeding, no drainage)  Musculoskeletal:         General: No deformity or signs of injury. Normal range of motion. Cervical back: Normal range of motion. Skin:     General: Skin is warm and dry. Capillary Refill: Capillary refill takes less than 2 seconds. Findings: No rash. Neurological:      General: No focal deficit present. Mental Status: She is alert and oriented to person, place, and time. Cranial Nerves: No cranial nerve deficit.    Psychiatric:         Mood and Affect: Mood normal.         Diagnostic Study Results     Labs -     Recent Results (from the past 48 hour(s))   PTT    Collection Time: 12/21/21  4:45 PM   Result Value Ref Range    aPTT 27.4 21.2 - 34.1 sec    aPTT, therapeutic range   82 - 109 sec   PROTHROMBIN TIME + INR    Collection Time: 12/21/21  4:45 PM   Result Value Ref Range    Prothrombin time 14.1 11.9 - 14.7 sec    INR 1.1 0.9 - 1.1     METABOLIC PANEL, COMPREHENSIVE    Collection Time: 12/21/21  4:45 PM   Result Value Ref Range    Sodium 140 136 - 145 mmol/L    Potassium 4.2 3.5 - 5.1 mmol/L    Chloride 106 97 - 108 mmol/L    CO2 28 21 - 32 mmol/L    Anion gap 6 5 - 15 mmol/L    Glucose 66 65 - 100 mg/dL    BUN 32 (H) 6 - 20 mg/dL    Creatinine 3.10 (H) 0.55 - 1.02 mg/dL    BUN/Creatinine ratio 10 (L) 12 - 20      GFR est AA 18 (L) >60 ml/min/1.73m2    GFR est non-AA 15 (L) >60 ml/min/1.73m2    Calcium 8.2 (L) 8.5 - 10.1 mg/dL    Bilirubin, total 0.6 0.2 - 1.0 mg/dL    AST (SGOT) 24 15 - 37 U/L    ALT (SGPT) 25 12 - 78 U/L    Alk. phosphatase 162 (H) 45 - 117 U/L    Protein, total 6.1 (L) 6.4 - 8.2 g/dL    Albumin 3.2 (L) 3.5 - 5.0 g/dL    Globulin 2.9 2.0 - 4.0 g/dL    A-G Ratio 1.1 1.1 - 2.2     CBC WITH AUTOMATED DIFF    Collection Time: 12/21/21  7:32 PM   Result Value Ref Range    WBC 3.4 (L) 3.6 - 11.0 K/uL    RBC 3.11 (L) 3.80 - 5.20 M/uL    HGB 9.7 (L) 11.5 - 16.0 g/dL    HCT 31.7 (L) 35.0 - 47.0 %    .9 (H) 80.0 - 99.0 FL    MCH 31.2 26.0 - 34.0 PG    MCHC 30.6 30.0 - 36.5 g/dL    RDW 13.6 11.5 - 14.5 %    PLATELET 88 (L) 279 - 400 K/uL    MPV 10.7 8.9 - 12.9 FL    NRBC 0.0 0.0  WBC    ABSOLUTE NRBC 0.00 0.00 - 0.01 K/uL    NEUTROPHILS 57 32 - 75 %    LYMPHOCYTES 30 12 - 49 %    MONOCYTES 10 5 - 13 %    EOSINOPHILS 2 0 - 7 %    BASOPHILS 1 0 - 1 %    IMMATURE GRANULOCYTES 0 0 - 0.5 %    ABS. NEUTROPHILS 1.9 1.8 - 8.0 K/UL    ABS. LYMPHOCYTES 1.0 0.8 - 3.5 K/UL    ABS. MONOCYTES 0.3 0.0 - 1.0 K/UL    ABS. EOSINOPHILS 0.1 0.0 - 0.4 K/UL    ABS. BASOPHILS 0.0 0.0 - 0.1 K/UL    ABS. IMM.  GRANS. 0.0 0.00 - 0.04 K/UL    DF AUTOMATED         Radiologic Studies -   XR Results (most recent):  Results from Hospital Encounter encounter on 12/21/21    XR CHEST PORT    Narrative  Portable upright radiograph chest 4:45 p.m. compared to December 3, 2021. INDICATION: Port-A-Cath bleeding. Heart size remains enlarged with an atherosclerotic aorta. Right chest wall  pacer remains in place. Interval placement of a right IJ catheter with tips  projecting over the right atrium. No pneumothorax. No gross infiltrate or  effusion. Staple line projects over the region of the gastroesophageal junction. Impression  1. Status post right IJ catheter placement without evidence of complication. CT Results  (Last 48 hours)    None            Medical Decision Making and ED Course   I am the first provider for this patient. I reviewed the vital signs, available nursing notes, past medical history, past surgical history, family history and social history. Vital Signs-Reviewed the patient's vital signs. Patient Vitals for the past 12 hrs:   Temp Pulse Resp BP SpO2   12/21/21 2027  60 15 (!) 158/70 98 %   12/21/21 1801 98 °F (36.7 °C) 62 16 (!) 161/76 99 %   12/21/21 1324 99.1 °F (37.3 °C) 66 16 (!) 151/68 99 %       Records Reviewed: Nursing Notes and Old Medical Records    Provider Notes (Medical Decision Making):       MDM  Number of Diagnoses or Management Options  Chronic kidney disease, unspecified CKD stage  Encounter for care related to Port-a-Cath  Thrombocytopenia [facility])  Diagnosis management comments: 82-year-old female is presenting with bleeding Port-A-Cath started a couple hours prior to arrival.  Next dialysis tomorrow morning. Patient does not have any other complaints. Chest x-ray reveals that the line is in place. Her bleeding has stopped and is controlled at this time. The area was cleaned and a quick clot dressing was placed with Tegaderm. H&H stable, platelets 88 in patient with chronic thrombocytopenia. PT, PTT intact.   Patient will be safely discharged home for her to continue dialysis treatment tomorrow. She voiced understanding of the discharge plan and voiced understanding to return to the emergency department for any worsening symptoms or recurrence of bleeding. Amount and/or Complexity of Data Reviewed  Clinical lab tests: ordered and reviewed  Tests in the radiology section of CPT®: ordered and reviewed  Review and summarize past medical records: yes          ED Course:   Initial assessment performed. The patients presenting problems have been discussed, and they are in agreement with the care plan formulated and outlined with them. I have encouraged them to ask questions as they arise throughout their visit. 5:59 PM  Blood clots removed from Port-A-Cath tubing, no active bleeding, in place per XR report. Placed quick clot and Tegaderm. Bleeding remained controlled. Procedures       Aubrey Standing, GERMAIN    Procedures   Aubrey Standing, GERMAIN        Disposition     Disposition: DC- Adult Discharges: All of the diagnostic tests were reviewed and questions answered. Diagnosis, care plan and treatment options were discussed. The patient understands the instructions and will follow up as directed. The patients results have been reviewed with them. They have been counseled regarding their diagnosis. The patient verbally convey understanding and agreement of the signs, symptoms, diagnosis, treatment and prognosis and additionally agrees to follow up as recommended with their PCP in 24 - 48 hours. They also agree with the care-plan and convey that all of their questions have been answered. I have also put together some discharge instructions for them that include: 1) educational information regarding their diagnosis, 2) how to care for their diagnosis at home, as well a 3) list of reasons why they would want to return to the ED prior to their follow-up appointment, should their condition change. Discharged    DISCHARGE PLAN:  1.    Current Discharge Medication List      CONTINUE these medications which have NOT CHANGED    Details   calcitRIOL (ROCALTROL) 0.5 mcg capsule Take 1 Capsule by mouth daily. Qty: 30 Capsule, Refills: 0      zinc oxide-white petrolatum 17-57 % topical paste Apply  to affected area as needed for Skin Irritation. Indications: skin irritation  Qty: 113 g, Refills: 0      ondansetron hcl (Zofran) 4 mg tablet Take 1 Tablet by mouth every eight (8) hours as needed for Nausea or Vomiting. Qty: 45 Tablet, Refills: 0      Vimpat 200 mg tab tablet Take 1 Tablet by mouth two (2) times a day. ondansetron (Zofran ODT) 4 mg disintegrating tablet 1 Tablet by SubLINGual route every eight (8) hours as needed for Nausea or Vomiting. Qty: 20 Tablet, Refills: 0      atorvastatin (LIPITOR) 40 mg tablet Take 1 tablet by mouth once daily for 90 days  Qty: 90 Tablet, Refills: 3      FLUoxetine (PROzac) 10 mg capsule Take 1 Capsule by mouth daily for 270 days. Qty: 90 Capsule, Refills: 2    Associated Diagnoses: TRISHA (generalized anxiety disorder)      loperamide (IMODIUM) 2 mg capsule TAKE 1 CAPSULE BY MOUTH SIX TIMES DAILY AS NEEDED FOR 30 DAYS  Qty: 180 Capsule, Refills: 0      Euthyrox 150 mcg tablet Take 1 tablet by mouth once daily  Qty: 90 Tablet, Refills: 0      levETIRAcetam (KEPPRA XR) 500 mg ER tablet TAKE 2 TABLETS BY MOUTH TWICE DAILY FOR 90 DAYS      sevelamer carbonate (RENVELA) 800 mg tab tab            2.   Follow-up Information     Follow up With Specialties Details Why Contact May Toledo MD Internal Medicine Schedule an appointment as soon as possible for a visit  for follow up from ER visit [address] [facility] [place]  [phone] [address] [facility] EMERGENCY DEPT Emergency Medicine  As needed, If symptoms worsen [address] [facility] [address]  [phone]        3. Return to ED if worse   4. Discharge Medication List as of 12/21/2021  8:23 PM          Diagnosis     Clinical Impression:   1.  Encounter for care related to Port-a-Cath    2. Thrombocytopenia ([address]    3. Chronic kidney disease, unspecified CKD stage        Attestations:    Irina Fox PA-C    Please note that this dictation was completed with [employer], the computer voice recognition software. Quite often unanticipated grammatical, syntax, homophones, and other interpretive errors are inadvertently transcribed by the computer software. Please disregard these errors. Please excuse any errors that have escaped final proofreading. Thank you. L/R

## 2025-07-14 NOTE — BRIEF OPERATIVE NOTE - NSICDXBRIEFPROCEDURE_GEN_ALL_CORE_FT
PROCEDURES:  Arthroscopic repair of right rotator cuff 14-Jul-2025 17:41:24 and right biceps tenodesis Evette Albarado

## 2025-07-14 NOTE — ASU DISCHARGE PLAN (ADULT/PEDIATRIC) - FINANCIAL ASSISTANCE
[facility] provides services at a reduced cost to those who are determined to be eligible through [facility]’s financial assistance program. Information regarding [facility]’s financial assistance program can be found by going to [email or web address] or by calling [phone].

## 2025-07-14 NOTE — ASU PATIENT PROFILE, ADULT - FALL HARM RISK - PT AGE POPULATION HIDDEN
Adult
no diplopia/no photophobia/no lacrimation L/no lacrimation R/no blurred vision L/no blurred vision R

## 2025-07-14 NOTE — ASU PATIENT PROFILE, ADULT - FALL HARM RISK - UNIVERSAL INTERVENTIONS
36.3 Bed in lowest position, wheels locked, appropriate side rails in place/Call bell, personal items and telephone in reach/Instruct patient to call for assistance before getting out of bed or chair/Non-slip footwear when patient is out of bed/[place]der to call system/Physically safe environment - no spills, clutter or unnecessary equipment/Purposeful Proactive Rounding/Room/bathroom lighting operational, light cord in reach

## 2025-07-14 NOTE — ASU PATIENT PROFILE, ADULT - NS TRANSFER EYEGLASSES PAIRS
Clinical Pharmacy - Warfarin Dosing Consult     Pharmacy has been consulted to manage this patient s warfarin therapy.  Indication: Bioprosthetic Heart Valve  Therapy Goal: INR 2-3  Provider/Team: Cricket Elizabeth PA-C OP Antico Clinic: N/A  Warfarin Prior to Admission: No (On warfarin in 2019 - doses ranging from 7.5 to 10 mg)  Significant drug interactions: PTA meds: Aspirin; New meds: ceftriaxone, cefepime, heparin, trazodone    INR   Date Value Ref Range Status   01/20/2022 1.30 (H) 0.85 - 1.15 Final   01/20/2022 1.38 (H) 0.85 - 1.15 Final       Recommend warfarin 7.5 mg today.  Pharmacy will monitor Jeremie Ceballos daily and order warfarin doses to achieve specified goal.      Please contact pharmacy as soon as possible if the warfarin needs to be held for a procedure or if the warfarin goals change.       1 pair

## 2025-07-14 NOTE — ASU DISCHARGE PLAN (ADULT/PEDIATRIC) - DIET/FLUID RESTRICTION
December 8, 2021         Patient: Mile Birmingham   YOB: 1981   Date of Visit: 12/8/2021           To Whom it May Concern:    Mile Birmingham was seen in my clinic on 12/8/2021. She may return to work on 12/9/21.    If you have any questions or concerns, please don't hesitate to call.        Sincerely,           BREA Baires.  Electronically Signed      No change/Progress slowly

## 2025-07-14 NOTE — ASU DISCHARGE PLAN (ADULT/PEDIATRIC) - CARE PROVIDER_API CALL
Jose Dumont  Orthopaedic Surgery  [address], [address]  [place] [address]  Phone: [phone]  Fax: [phone]  Follow Up Time: 1 week

## 2025-07-14 NOTE — BRIEF OPERATIVE NOTE - NSICDXBRIEFPOSTOP_GEN_ALL_CORE_FT
Upper Extremity Contusion  You have a contusion (bruise) of an upper extremity (arm, wrist, hand, or fingers). Symptoms include pain, swelling, and skin discoloration. No bones are broken. This injury may take from a few days to a few weeks to heal.  During that time, the bruise may change from reddish in color, to purple-blue, to green-yellow, to yellow-brown.  Home care    Unless another medicine was prescribed, you can take acetaminophen, ibuprofen, or naproxen to control pain. (If you have chronic liver or kidney disease or ever had a stomach ulcer or gastrointestinal bleeding, talk with your doctor before using these medicines.)    Elevate the injured area to reduce pain and swelling. As much as possible, sit or lie down with the injured area raised about the level of your heart. This is especially important during the first 48 hours.    Ice the injured area to help reduce pain and swelling. Wrap a cold source (ice pack or ice cubes in a plastic bag) in a thin towel. Apply to the bruised area for 20 minutes every 1 to 2 hours the first day. Continue this 3 to 4 times a day until the pain and swelling goes away.    If a sling was provided, you may remove it to shower or bathe. To prevent joint stiffness, do not wear it for more than 1 week.  Follow-up care  Follow up with your healthcare provide, or as advised. Call if you are not improving within the next 1 to 2 weeks.  When to seek medical advice   Call your healthcare provider right away if any of these occur:    Increased pain or swelling    Hand or fingers become cold, blue, numb or tingly    Signs of infection: Warmth, drainage, or increased redness or pain around the injury    Inability to move the injured body part     Frequent bruising for unknown reasons  Date Last Reviewed: 2/1/2017 2000-2017 [employer]. [address] [facility], [place] [address]. All rights reserved. This information is not intended as a substitute for professional  medical care. Always follow your healthcare professional's instructions.        Abrasions  Abrasions are skin scrapes. Their treatment depends on how large and deep the abrasion is.  Home care  You may be prescribed an antibiotic cream or ointment to apply to the wound. This helps prevent infection. Follow instructions when using this medicine.  General care    To care for the abrasion, do the following each day for as long as directed by your healthcare provider.    If you were given a bandage, change it once a day. If your bandage sticks to the wound, soak it in warm water until it loosens.    Wash the area with soap and warm water. You may do this in a sink or under a tub faucet or shower. Rinse off the soap. Then pat the area dry with a clean towel.    If antibiotic ointment or cream was prescribed, reapply it to the wound as directed. Cover the wound with a fresh nonstick bandage. If the bandage becomes wet or dirty, change it as soon as possible.    Some antibiotic ointments or cream can cause an allergic reaction or dermatitis. This may cause redness, itching and or hives. If this occurs, stop using the ointment immediately and wash off any remaining ointment. You may need to take some allergy medicine to relieve symptoms.    You may use acetaminophen or ibuprofen to control pain unless another pain medicine was prescribed. Talk with your healthcare provider before using these medicines if you have chronic liver or kidney disease or ever had a stomach ulcer or GI bleeding. Don t use ibuprofen in children younger than six months old.    Most skin wounds heal within 10 days. But an infection may occur even with treatment. So it s important to watch the wound for signs of infection as listed below.  Follow-up care  Follow up with your healthcare provider, or as advised.  When to seek medical advice  Call your healthcare provider right away if any of these occur:    Fever of 100.4 F (38 C) or higher, or as  directed by your healthcare provider    Increasing pain, redness, swelling, or drainage from the wound    Bleeding from the wound that does not stop after a few minutes of steady, firm pressure    Decreased ability to move any body part near the wound  Date Last Reviewed: 3/3/2017    3506-6550 [employer] [address] [place] [address]. All rights reserved. This information is not intended as a substitute for professional medical care. Always follow your healthcare professional's instructions.        Anxiety Reaction  Anxiety is the feeling we all get when we think something bad might happen. It is a normal response to stress and usually causes only a mild reaction. When anxiety becomes more severe, it can interfere with daily life. In some cases, you may not even be aware of what it is you re anxious about. There may also be a genetic link or it may be a learned behavior in the home.  Both psychological and physical triggers cause stress reaction. It's often a response to fear or emotional stress, real or imagined. This stress may come from home, family, work, or social relationships.  During an anxiety reaction, you may feel:    Helpless    Nervous    Depressed    Irritable  Your body may show signs of anxiety in many ways. You may experience:    Dry mouth    Shakiness    Dizziness    Weakness    Trouble breathing    Breathing fast (hyperventilating)    Chest pressure    Sweating    Headache    Nausea    Diarrhea    Tiredness    Inability to sleep    Sexual problems  Home care    Try to locate the sources of stress in your life. They may not be obvious. These may include:    Daily hassles of life (traffic jams, missed appointments, car troubles, etc.)    Major life changes, both good (new baby, job promotion) and bad (loss of job, loss of loved one)    Overload: feeling that you have too many responsibilities and can't take care of all of them at once    Feeling helpless, feeling that  your problems are beyond what you re able to solve    Notice how your body reacts to stress. Learn to listen to your body signals. This will help you take action before the stress becomes severe.    When you can, do something about the source of your stress. (Avoid hassles, limit the amount of change that happens in your life at one time and take a break when you feel overloaded).    Unfortunately, many stressful situations can't be avoided. It is necessary to learn how to better manage stress. There are many proven methods that will reduce your anxiety. These include simple things like exercise, good nutrition and adequate rest. Also, there are certain techniques that are helpful:    Relaxation    Breathing exercises    Visualization    Biofeedback    Meditation  For more information about this, consult your doctor or go to a local bookstore and review the many books and tapes available on this subject.  Follow-up care  If you feel that your anxiety is not responding to self-help measures, contact your doctor or make an appointment with a counselor. You may need short-term psychological counseling and temporary medicine to help you manage stress.  Call 911  Call your healthcare provider right away if any of these occur:    Trouble breathing    Confusion    Drowsiness or trouble wakening    Fainting or loss of consciousness    Rapid heart rate    Seizure    New chest pain that becomes more severe, lasts longer, or spreads into your shoulder, arm, neck, jaw, or back  When to seek medical advice  Call your healthcare provider right away if any of these occur:    Your symptoms get worse    Severe headache not relieved by rest and mild pain reliever  Date Last Reviewed: 9/29/2015 2000-2017 [employer]. [address] [place] [address], [place] [address]. All rights reserved. This information is not intended as a substitute for professional medical care. Always follow your healthcare professional's  instructions.        Depression  Depression is one of the most common mental health problems today. It is not just a state of unhappiness or sadness. It is a true disease. The cause seems to be related to a decrease in chemicals that transmit signals in the brain. Having a family history of depression, alcoholism, or suicide increases the risk. Chronic illness, chronic pain, migraine headaches and high emotional stress also increase the risk.  Depression is something we tend to recognize in others, but may have a hard time seeing in ourselves. It can show in many physical and emotional ways:    Loss of appetite    Over-eating    Not being able to sleep    Sleeping too much    Tiredness not related to physical exertion    Restlessness or irritability    Slowness of movement or speech    Feeling depressed or withdrawn    Loss of interest in things you once enjoyed    Trouble concentrating, poor memory, trouble making decisions    Thoughts of harming or killing oneself, or thoughts that life is not worth living    Low self-esteem  The treatment for depression may include both medicine and psychotherapy. Antidepressants can reduce suffering and can improve the ability to function during the depressed period. Therapy can offer emotional support and help you understand emotional factors that may be causing the depression.  Home care    On-going care and support helps people manage this disease.  Find a healthcare provider and therapist who meet your needs. Seek help when you feel like you may be getting ill.    Be kind to yourself. Make it a point to do things that you enjoy (gardening, walking in nature, going to a movie, etc.). Reward yourself for small successes.    Take care of your physical body. Eat a balanced diet (low in saturated fat and high in fruits and vegetables). Exercise at least 3 times a week for 30 minutes. Even mild-moderate exercise (like brisk walking) can make you feel better.    Avoid alcohol,  which can make depression worse.    Take medicine as prescribed.    Tell each of your healthcare providers about all of the prescription drugs, over-the-counter medicines, vitamins, and supplements you take. Certain supplements interact with medicines and can result in dangerous side effects. Ask your pharmacist when you have questions about drug interactions.    Talk with your family and trusted friends about your feelings and thoughts. Ask them to help you recognize behavior changes early so you can get help and, if needed, medicine can be adjusted.  Follow-up care  Follow up with your healthcare provider, or as advised.  Call 911  Call 911 if you:    Have suicidal thoughts, a suicide plan, and the means to carry out the plan    Have trouble breathing    Are very confused    Feel very drowsy or have trouble awakening    Faint or lose consciousness    Have new chest pain that becomes more severe, lasts longer, or spreads into your shoulder, arm, neck, jaw or back  When to seek medical advice  Call your healthcare provider right away if any of these occur:    Feeling extreme depression, fear, anxiety, or anger toward yourself or others    Feeling out of control    Feeling that you may try to harm yourself or another    Hearing voices that others do not hear    Seeing things that others do not see    Can t sleep or eat for 3 days in a row    Friends or family express concern over your behavior and ask you to seek help  Date Last Reviewed: 9/29/2015 2000-2017 [employer]. [address] [place] [address], [place] [address]. All rights reserved. This information is not intended as a substitute for professional medical care. Always follow your healthcare professional's instructions.         POST-OP DIAGNOSIS:  Right rotator cuff tear 14-Jul-2025 17:44:42  Evette Albarado

## 2025-07-14 NOTE — ASU DISCHARGE PLAN (ADULT/PEDIATRIC) - NS MD DC FALL RISK RISK
For information on Fall & Injury Prevention, visit: [email or web address] OR  [email or web address] OR  https://www.cdc.gov/steadi/patient.html

## 2025-07-22 ENCOUNTER — NON-APPOINTMENT (OUTPATIENT)
Age: 70
End: 2025-07-22

## 2025-07-24 ENCOUNTER — APPOINTMENT (OUTPATIENT)
Dept: ORTHOPEDIC SURGERY | Facility: CLINIC | Age: 70
End: 2025-07-24
Payer: COMMERCIAL

## 2025-07-24 DIAGNOSIS — M75.121 COMPLETE ROTATOR CUFF TEAR OR RUPTURE OF RIGHT SHOULDER, NOT SPECIFIED AS TRAUMATIC: ICD-10-CM

## 2025-07-24 PROCEDURE — 99024 POSTOP FOLLOW-UP VISIT: CPT

## 2025-08-18 ENCOUNTER — NON-APPOINTMENT (OUTPATIENT)
Age: 70
End: 2025-08-18

## 2025-08-25 ENCOUNTER — APPOINTMENT (OUTPATIENT)
Dept: ORTHOPEDIC SURGERY | Facility: CLINIC | Age: 70
End: 2025-08-25
Payer: COMMERCIAL

## 2025-08-25 DIAGNOSIS — M75.121 COMPLETE ROTATOR CUFF TEAR OR RUPTURE OF RIGHT SHOULDER, NOT SPECIFIED AS TRAUMATIC: ICD-10-CM

## 2025-08-25 PROCEDURE — 99024 POSTOP FOLLOW-UP VISIT: CPT

## (undated) DEVICE — CANNULA LINVATEC SHOULDER 7CM (GREY & ORANGE)

## (undated) DEVICE — TUBING DEPUY MITEK FMS OUTFLOW

## (undated) DEVICE — WARMING GOWN BAIR PAWS STANDARD

## (undated) DEVICE — POSITIONER STIRRUP STRAP W SLIP RING 19X3.5"

## (undated) DEVICE — BLADE SCALPEL SAFETYLOCK #11

## (undated) DEVICE — POSITIONER OR TABLE/STRETCHER STRAP

## (undated) DEVICE — Device

## (undated) DEVICE — TUBING DEPUY MITEK FMS VUE INFLOW

## (undated) DEVICE — SUT MONOSOF 4-0 18" P-12

## (undated) DEVICE — SOL IRR BAG NS 0.9% 3000ML

## (undated) DEVICE — POSITIONER FOAM HEAD CRADLE (PINK)

## (undated) DEVICE — DRAPE TOP SHEET 53" X 101"

## (undated) DEVICE — NDL OBTURA 25G

## (undated) DEVICE — ADAPTER DUAL SPIKE

## (undated) DEVICE — S&N ARTHROCARE WAND TURBOVAC 90 DEGREE

## (undated) DEVICE — NDL SPINAL 18G X 3.5" (PINK)

## (undated) DEVICE — DVC SUCTION FLR PUDDLE GUPPY

## (undated) DEVICE — BUR LINVATEC OVAL 4MM

## (undated) DEVICE — PACK SHOULDER ARTHROSCOPY

## (undated) DEVICE — WARMING BLANKET LOWER ADULT

## (undated) DEVICE — SHAVER BLADE LINVATEC FULL RADIUS RESECTOR 3.5MM

## (undated) DEVICE — ARTHREX KIT FOR 2.6 FIBERTAK ANCHORS

## (undated) DEVICE — ARTHREX KIT FOR 3.5MM PUSHLOCK

## (undated) DEVICE — VENODYNE/SCD SLEEVE CALF MEDIUM

## (undated) DEVICE — DRSG STERISTRIPS 0.5 X 4"

## (undated) DEVICE — CANNULA ARTHREX TWIST IN NO SQUIRT CAP 7X7 PURPLE

## (undated) DEVICE — POSITIONER STRAP ARMBOARD VELCRO TS-30

## (undated) DEVICE — SUT RETRIEVER S&N LAVENDER

## (undated) DEVICE — SHAVER BLADE S&N FULL RADIUS 3.5MM STRAIGHT (BEIGE)

## (undated) DEVICE — ARTHREX MULTIFIRE SCORPION NEEDLE

## (undated) DEVICE — PACK ARTHROSCOPY

## (undated) DEVICE — CANNULA ARTHREX TWIST IN 8.25MMX7CM

## (undated) DEVICE — POSITIONER POSITIONING ROLL  5X17"

## (undated) DEVICE — SUT MONOSOF 3-0 30" C-13

## (undated) DEVICE — SUT FIBERINK WITH LOOP 1.3MM (WHITE /BLUE)

## (undated) DEVICE — SLING SHOULDER IMMOBILIZER CLINIC LARGE

## (undated) DEVICE — CANNULA ARTHREX TWIST IN 6MMX7CM

## (undated) DEVICE — BAG SPONGE COUNTER EZ

## (undated) DEVICE — SLING SHOULDER ULTRASLING LARGE

## (undated) DEVICE — TUBING SUCTION 20FT

## (undated) DEVICE — MASK N95 3M 1870 PLUS

## (undated) DEVICE — DRAPE INSTRUMENT POUCH 6.75" X 11"

## (undated) DEVICE — TUBING SET S&N A127 PUMP STERILE

## (undated) DEVICE — TUBING SET GRAVITY 4 SPIKE

## (undated) DEVICE — DRAPE IOBAN 23" X 23"

## (undated) DEVICE — DRAPE SPLIT SHEET 77" X 108"